# Patient Record
Sex: FEMALE | Race: WHITE | NOT HISPANIC OR LATINO | Employment: FULL TIME | URBAN - METROPOLITAN AREA
[De-identification: names, ages, dates, MRNs, and addresses within clinical notes are randomized per-mention and may not be internally consistent; named-entity substitution may affect disease eponyms.]

---

## 2017-02-02 ENCOUNTER — OFFICE VISIT (OUTPATIENT)
Dept: OTOLARYNGOLOGY | Facility: CLINIC | Age: 52
End: 2017-02-02

## 2017-02-02 VITALS — WEIGHT: 221 LBS | HEIGHT: 66 IN | BODY MASS INDEX: 35.52 KG/M2

## 2017-02-02 DIAGNOSIS — H60.503 ACUTE OTITIS EXTERNA OF BOTH EARS, UNSPECIFIED TYPE: Primary | ICD-10-CM

## 2017-02-02 DIAGNOSIS — H92.09 EAR PAIN: Primary | ICD-10-CM

## 2017-02-02 LAB
GRAM STN SPEC: ABNORMAL
MICRO REPORT STATUS: ABNORMAL
SPECIMEN SOURCE: ABNORMAL

## 2017-02-02 RX ORDER — CLOTRIMAZOLE 1 %
CREAM (GRAM) TOPICAL
Qty: 2 G | Refills: 1 | Status: SHIPPED | OUTPATIENT
Start: 2017-02-02 | End: 2024-07-16

## 2017-02-02 RX ORDER — CLOTRIMAZOLE 1 G/ML
SOLUTION TOPICAL
Qty: 10 ML | Refills: 1 | Status: SHIPPED | OUTPATIENT
Start: 2017-02-02 | End: 2024-07-16

## 2017-02-02 RX ORDER — TOBRAMYCIN AND DEXAMETHASONE 3; 1 MG/ML; MG/ML
SUSPENSION/ DROPS OPHTHALMIC
Qty: 1 BOTTLE | Refills: 0 | Status: SHIPPED | OUTPATIENT
Start: 2017-02-02 | End: 2024-07-16

## 2017-02-02 ASSESSMENT — PAIN SCALES - GENERAL: PAINLEVEL: NO PAIN (0)

## 2017-02-02 NOTE — MR AVS SNAPSHOT
After Visit Summary   2/2/2017    Veronica Turk    MRN: [id number]           Patient Information     Date Of Birth          1965        Visit Information        Provider Department      2/2/2017 1:00 PM Catrachito Woodruff MD [facility]        Today's Diagnoses     Acute otitis externa of both ears, unspecified type    -  1       Care Instructions    The patient presents with a history of bilateral otitis externa. Cultures are collected and she will be initiated on Tobradex Drops, Lotrimin Cream and Lotrimin Drops. The patient will be seen again in one week and adjustments to therapy will be made as appropriate.         Follow-ups after your visit        Your next 10 appointments already scheduled     Feb 02, 2017  1:00 PM   (Arrive by 12:45 PM)   Return Visit with Catrachito Woodruff MD   [facility] ([facility])    [address] [place] [address] [phone]            Feb 10, 2017  8:30 AM   (Arrive by 8:15 AM)   Return Visit with Catrachito Woodruff MD   [facility])    [address] [place] [address] [phone]              Who to contact     Please call your clinic at [phone] to:    Ask questions about your health    Make or cancel appointments    Discuss your medicines    Learn about your test results    Speak to your doctor   If you have compliments or concerns about an experience at your clinic, or if you wish to file a complaint, please contact [facility] Patient Relations at [phone] or email us at [email or web address]         Additional Information About Your Visit        ReviewZAPhart Information     SUPENTA is an electronic gateway that provides easy, online access to your medical records. With SUPENTA, you can request a clinic appointment, read  "your test results, renew a prescription or communicate with your care team.     To sign up for Geswindhart visit the website at [email or web address]   You will be asked to enter the access code listed below, as well as some personal information. Please follow the directions to create your username and password.     Your access code is: [email or web address]  Expires: 5/3/2017 11:25 AM     Your access code will  in 90 days. If you need help or a new code, please contact your [facility] or call [phone] for assistance.        Care EveryWhere ID     This is your Care EveryWhere ID. This could be used by other organizations to access your [place] medical records  [id number]        Your Vitals Were     Height BMI (Body Mass Index)                1.68 m (5' 6.14\") 35.52 kg/m2           Blood Pressure from Last 3 Encounters:   11 112/71    Weight from Last 3 Encounters:   17 100.245 kg (221 lb)   16 98.567 kg (217 lb 4.8 oz)   11 83.8 kg (184 lb 11.9 oz)              We Performed the Following     Ear Culture Aerobic Bacterial     Gram stain          Today's Medication Changes          These changes are accurate as of: 17 11:32 AM.  If you have any questions, ask your nurse or doctor.               Start taking these medicines.        Dose/Directions    * clotrimazole 1 % solution   Commonly known as:  LOTRIMIN   Used for:  Acute otitis externa of both ears, unspecified type   Started by:  Catrachito Woodruff MD        Apply 2 drops to each ear canal BID X 1 month supply   Quantity:  10 mL   Refills:  1       * clotrimazole 1 % cream   Commonly known as:  LOTRIMIN   Used for:  Acute otitis externa of both ears, unspecified type   Started by:  Catrachito Woodruff MD        Apply to affected area BID X 1 month supply   Quantity:  2 g   Refills:  1       * Notice:  This list has 2 medication(s) that are the same as other medications prescribed for " you. Read the directions carefully, and ask your doctor or other care provider to review them with you.      These medicines have changed or have updated prescriptions.        Dose/Directions    tobramycin-dexamethasone 0.3-0.1 % ophthalmic susp   Commonly known as:  TOBRADEX   This may have changed:  additional instructions   Used for:  Acute otitis externa of both ears, unspecified type   Changed by:  Catrachito Woodruff MD        Two drops to each ear BID X 14 days supply   Quantity:  1 Bottle   Refills:  0         Stop taking these medicines if you haven't already. Please contact your care team if you have questions.     ciprofloxacin-dexamethasone otic suspension   Commonly known as:  CIPRODEX   Stopped by:  Catrachito Woodruff MD           neomycin-polymyxin-hydrocortisone otic solution   Commonly known as:  CORTISPORIN   Stopped by:  Catrachito Woodruff MD                Where to get your medicines      These medications were sent to [id number] - [place], [place] - [address] [facility]  [address] [facility] [address]     Phone:  [phone]    - clotrimazole 1 % cream  - clotrimazole 1 % solution  - tobramycin-dexamethasone 0.3-0.1 % ophthalmic susp             Primary Care Provider Office Phone # Fax #    Chani Ghotra [phone]       [facility] [place] [id number] [address] [address]        Thank you!     Thank you for choosing [facility]  for your care. Our goal is always to provide you with excellent care. Hearing back from our patients is one way we can continue to improve our services. Please take a few minutes to complete the written survey that you may receive in the mail after your visit with us. Thank you!             Your Updated Medication List - Protect others around you: Learn how to safely use, store and throw away your medicines at www.disposemymeds.org.          This list is accurate as of:  2/2/17 11:32 AM.  Always use your most recent med list.                   Brand Name Dispense Instructions for use    * clotrimazole 1 % solution    LOTRIMIN    10 mL    Apply 2 drops to each ear canal BID X 1 month supply       * clotrimazole 1 % cream    LOTRIMIN    2 g    Apply to affected area BID X 1 month supply       MULTIVITAL PO          mupirocin 2 % ointment    BACTROBAN    1 g    Apply a small amount to both ear 2 times a day for 10 days       OMEGA-3 FISH OIL PO          tobramycin-dexamethasone 0.3-0.1 % ophthalmic susp    TOBRADEX    1 Bottle    Two drops to each ear BID X 14 days supply       * triamcinolone 0.025 % cream    KENALOG    5 g    Please use thin layer bilateral ear canals opening at night to begin after 3 weeks of Triamcinolone at strength of 0.1%       * triamcinolone 0.1 % cream    KENALOG    1 g    Use thin layer to bilatereal external auditory canal opening at night x 3 weeks, then stop, use 0.025 % strength as maintenance       * triamcinolone 0.025 % cream    KENALOG    80 g    Apply topically At Bedtime Apply to affected ear after completion of bactroban treatment.       ZOLOFT PO      Take 40 mg by mouth daily       * Notice:  This list has 5 medication(s) that are the same as other medications prescribed for you. Read the directions carefully, and ask your doctor or other care provider to review them with you.

## 2017-02-02 NOTE — NURSING NOTE
Chief Complaint   Patient presents with     RECHECK     possible ear infection. Needs ear cleaning first then audio     Jose Calderon LPN

## 2017-02-02 NOTE — Clinical Note
2/2/2017       RE: Veronica Turk  [address] [facility] [address]     Dear Colleague,    Thank you for referring your patient, Veronica Turk, to the [facility] at [facility]. Please see a copy of my visit note below.    The patient presents with a history of chronic otitis media and mastoiditis. She has previously undergone successful surgery with Dr. Moon Richter on her right ear. She recently developed an acute infection in the ear canals. The patient reports that her ear are plugged and her hearing is diminished in both ears. She has been evaluated for allergies and no specific cause of the chapping, crusting and itching on the conchal bowls of the ears was identified.        All other systems were reviewed and they are either negative or they are not directly pertinent to this Otolaryngology examination.      Past Medical History:    Past Medical History   Diagnosis Date     Tinnitus      Nasal congestion      Sore throat      Snoring      Eczema      Hearing problem        Past Surgical History:    Past Surgical History   Procedure Laterality Date     Breast surgery       reduction     Orthopedic surgery       knee acl scope     Tympanoplasty  8/17/2011     Procedure:TYMPANOPLASTY; Posterior Auricular with facial nerve monitoring *Latex Safe* tympanoplasty with canalplasty; Surgeon:MOON RICHTER; Location:[facility] OR       Medications:      Current outpatient prescriptions:      triamcinolone (KENALOG) 0.025 % cream, Apply topically At Bedtime Apply to affected ear after completion of bactroban treatment., Disp: 80 g, Rfl: 5     Omega-3 Fatty Acids (OMEGA-3 FISH OIL PO), , Disp: , Rfl:      Sertraline HCl (ZOLOFT PO), Take 40 mg by mouth daily, Disp: , Rfl:      Multiple Vitamins-Minerals (MULTIVITAL PO), , Disp: , Rfl:      mupirocin (BACTROBAN) 2 % ointment, Apply a small amount to both ear 2 times a day for 10 days, Disp: 1  g, Rfl: 1     triamcinolone (KENALOG) 0.025 % cream, Please use thin layer bilateral ear canals opening at night to begin after 3 weeks of Triamcinolone at strength of 0.1%, Disp: 5 g, Rfl: 2     triamcinolone (KENALOG) 0.1 % cream, Use thin layer to bilatereal external auditory canal opening at night x 3 weeks, then stop, use 0.025 % strength as maintenance, Disp: 1 g, Rfl: 1    Allergies:    Ciprofloxacin and Ciprofloxacin    Physical Examination:    The patient is a well developed, well nourished female in no apparent distress.  She is normocephalic, atraumatic with pupils equally round and reactive to light.    Oral Cavity Examination:  Normal mucosa with no masses or lesions  Nasal Examination: Normal mucosa with no masses or lesions  Ear Examination: Ear canals are impacted with infected debris and cerumen and the skin of the conchal bowls of the ears bilaterally are crusted and scabbed. A culture is collected from the right ear canal. Tympanic membranes are intact and middle ear spaces free of infections or effusions.   Neurological Examination: Facial nerve function intact and symmetric  Integumentary Examination: No lesions on the skin of the head and neck    Assessment and Plan:    The patient presents with a history of bilateral otitis externa. Cultures are collected and she will be initiated on Tobradex Drops, Lotrimin Cream and Lotrimin Drops. The patient will be seen again in one week and adjustments to therapy will be made as appropriate.     CC: Dr. Moon Richter      Again, thank you for allowing me to participate in the care of your patient.      Sincerely,    Catrachito Woodruff MD

## 2017-02-02 NOTE — PATIENT INSTRUCTIONS
The patient presents with a history of bilateral otitis externa. Cultures are collected and she will be initiated on Tobradex Drops, Lotrimin Cream and Lotrimin Drops. The patient will be seen again in one week and adjustments to therapy will be made as appropriate.

## 2017-02-02 NOTE — PROGRESS NOTES
The patient presents with a history of chronic otitis media and mastoiditis. She has previously undergone successful surgery with Dr. Moon Richter on her right ear. She recently developed an acute infection in the ear canals. The patient reports that her ear are plugged and her hearing is diminished in both ears. She has been evaluated for allergies and no specific cause of the chapping, crusting and itching on the conchal bowls of the ears was identified.        All other systems were reviewed and they are either negative or they are not directly pertinent to this Otolaryngology examination.      Past Medical History:    Past Medical History   Diagnosis Date     Tinnitus      Nasal congestion      Sore throat      Snoring      Eczema      Hearing problem        Past Surgical History:    Past Surgical History   Procedure Laterality Date     Breast surgery       reduction     Orthopedic surgery       knee acl scope     Tympanoplasty  8/17/2011     Procedure:TYMPANOPLASTY; Posterior Auricular with facial nerve monitoring *Latex Safe* tympanoplasty with canalplasty; Surgeon:MOON RICHTER; Location: OR       Medications:      Current outpatient prescriptions:      triamcinolone (KENALOG) 0.025 % cream, Apply topically At Bedtime Apply to affected ear after completion of bactroban treatment., Disp: 80 g, Rfl: 5     Omega-3 Fatty Acids (OMEGA-3 FISH OIL PO), , Disp: , Rfl:      Sertraline HCl (ZOLOFT PO), Take 40 mg by mouth daily, Disp: , Rfl:      Multiple Vitamins-Minerals (MULTIVITAL PO), , Disp: , Rfl:      mupirocin (BACTROBAN) 2 % ointment, Apply a small amount to both ear 2 times a day for 10 days, Disp: 1 g, Rfl: 1     triamcinolone (KENALOG) 0.025 % cream, Please use thin layer bilateral ear canals opening at night to begin after 3 weeks of Triamcinolone at strength of 0.1%, Disp: 5 g, Rfl: 2     triamcinolone (KENALOG) 0.1 % cream, Use thin layer to bilatereal external auditory canal opening at night x  3 weeks, then stop, use 0.025 % strength as maintenance, Disp: 1 g, Rfl: 1    Allergies:    Ciprofloxacin and Ciprofloxacin    Physical Examination:    The patient is a well developed, well nourished female in no apparent distress.  She is normocephalic, atraumatic with pupils equally round and reactive to light.    Oral Cavity Examination:  Normal mucosa with no masses or lesions  Nasal Examination: Normal mucosa with no masses or lesions  Ear Examination: Ear canals are impacted with infected debris and cerumen and the skin of the conchal bowls of the ears bilaterally are crusted and scabbed. A culture is collected from the right ear canal. Tympanic membranes are intact and middle ear spaces free of infections or effusions.   Neurological Examination: Facial nerve function intact and symmetric  Integumentary Examination: No lesions on the skin of the head and neck    Assessment and Plan:    The patient presents with a history of bilateral otitis externa. Cultures are collected and she will be initiated on Tobradex Drops, Lotrimin Cream and Lotrimin Drops. The patient will be seen again in one week and adjustments to therapy will be made as appropriate.     CC: Dr. Moon Richter

## 2017-02-05 LAB
BACTERIA SPEC CULT: ABNORMAL
MICRO REPORT STATUS: ABNORMAL
MICROORGANISM SPEC CULT: ABNORMAL
SPECIMEN SOURCE: ABNORMAL

## 2017-02-06 RX ORDER — SULFAMETHOXAZOLE/TRIMETHOPRIM 800-160 MG
1 TABLET ORAL 2 TIMES DAILY
Qty: 20 TABLET | Refills: 0 | Status: SHIPPED | OUTPATIENT
Start: 2017-02-06 | End: 2017-02-16

## 2017-02-06 NOTE — PROGRESS NOTES
Based upon the culture and sensitivity report, the patient will be treated with bactrim in addition to tobradex otic drops and she will be seen again in one.

## 2017-02-10 ENCOUNTER — OFFICE VISIT (OUTPATIENT)
Dept: OTOLARYNGOLOGY | Facility: CLINIC | Age: 52
End: 2017-02-10

## 2017-02-10 DIAGNOSIS — H60.503 ACUTE OTITIS EXTERNA OF BOTH EARS, UNSPECIFIED TYPE: ICD-10-CM

## 2017-02-10 DIAGNOSIS — H60.63 CHRONIC OTITIS EXTERNA OF BOTH EARS, UNSPECIFIED TYPE: Primary | ICD-10-CM

## 2017-02-10 ASSESSMENT — PAIN SCALES - GENERAL: PAINLEVEL: NO PAIN (0)

## 2017-02-10 NOTE — MR AVS SNAPSHOT
After Visit Summary   2/10/2017    Veronica Turk    MRN: [id number]           Patient Information     Date Of Birth          1965        Visit Information        Provider Department      2/10/2017 8:30 AM Catrachito Woodruff MD [facility]        Today's Diagnoses     Chronic otitis externa of both ears, unspecified type    -  1     Acute otitis externa of both ears, unspecified type           Care Instructions    The patient presents with a history of bilateral otitis externa. Her therapy was adjusted based upon her culture results and she has responded very well to Bactrim, Tobradex Drops, Lotrimin Cream and Lotrimin Drops. The patient will be seen again in two weeks and a new culture will be obtained at that time to verify complete resolution of her infection.        Follow-ups after your visit        Your next 10 appointments already scheduled     Feb 24, 2017  8:45 AM   (Arrive by 8:30 AM)   Return Visit with MD FREDY [facility] ([facility])    [address] [place] [address] [phone]              Who to contact     Please call your clinic at [phone] to:    Ask questions about your health    Make or cancel appointments    Discuss your medicines    Learn about your test results    Speak to your doctor   If you have compliments or concerns about an experience at your clinic, or if you wish to file a complaint, please contact [facility] Patient Relations at [phone] or email us at [email or web address]         Additional Information About Your Visit        MyChart Information     Blippar is an electronic gateway that provides easy, online access to your medical records. With Blippar, you can request a clinic appointment, read your test results, renew a prescription or communicate with your care team.     To sign up for  Aneglt visit the website at [email or web address]   You will be asked to enter the access code listed below, as well as some personal information. Please follow the directions to create your username and password.     Your access code is: [email or web address]  Expires: 5/3/2017 11:25 AM     Your access code will  in 90 days. If you need help or a new code, please contact your [facility] or call [phone] for assistance.        Care EveryWhere ID     This is your Care EveryWhere ID. This could be used by other organizations to access your [place] medical records  [id number]         Blood Pressure from Last 3 Encounters:   11 112/71    Weight from Last 3 Encounters:   17 100.245 kg (221 lb)   16 98.567 kg (217 lb 4.8 oz)   11 83.8 kg (184 lb 11.9 oz)              Today, you had the following     No orders found for display       Primary Care Provider Office Phone # Fax #    Chani Ghotra [phone]       [facility] [address] [address]        Thank you!     Thank you for choosing [facility] TH[facility]  for your care. Our goal is always to provide you with excellent care. Hearing back from our patients is one way we can continue to improve our services. Please take a few minutes to complete the written survey that you may receive in the mail after your visit with us. Thank you!             Your Updated Medication List - Protect others around you: Learn how to safely use, store and throw away your medicines at www.disposemymeds.org.          This list is accurate as of: 2/10/17  8:30 AM.  Always use your most recent med list.                   Brand Name Dispense Instructions for use    * clotrimazole 1 % solution    LOTRIMIN    10 mL    Apply 2 drops to each ear canal BID X 1 month supply       * clotrimazole 1 % cream    LOTRIMIN    2 g    Apply to affected area BID X 1 month supply        MULTIVITAL PO          mupirocin 2 % ointment    BACTROBAN    1 g    Apply a small amount to both ear 2 times a day for 10 days       OMEGA-3 FISH OIL PO          sulfamethoxazole-trimethoprim 800-160 MG per tablet    BACTRIM DS    20 tablet    Take 1 tablet by mouth 2 times daily for 10 days       tobramycin-dexamethasone 0.3-0.1 % ophthalmic susp    TOBRADEX    1 Bottle    Two drops to each ear BID X 14 days supply       * triamcinolone 0.025 % cream    KENALOG    5 g    Please use thin layer bilateral ear canals opening at night to begin after 3 weeks of Triamcinolone at strength of 0.1%       * triamcinolone 0.1 % cream    KENALOG    1 g    Use thin layer to bilatereal external auditory canal opening at night x 3 weeks, then stop, use 0.025 % strength as maintenance       * triamcinolone 0.025 % cream    KENALOG    80 g    Apply topically At Bedtime Apply to affected ear after completion of bactroban treatment.       ZOLOFT PO      Take 40 mg by mouth daily       * Notice:  This list has 5 medication(s) that are the same as other medications prescribed for you. Read the directions carefully, and ask your doctor or other care provider to review them with you.

## 2017-02-10 NOTE — NURSING NOTE
Chief Complaint   Patient presents with     RECHECK     acute otitis     Janice Armenta Medical Assistant

## 2017-02-10 NOTE — PATIENT INSTRUCTIONS
The patient presents with a history of bilateral otitis externa. Her therapy was adjusted based upon her culture results and she has responded very well to Bactrim, Tobradex Drops, Lotrimin Cream and Lotrimin Drops. The patient will be seen again in two weeks and a new culture will be obtained at that time to verify complete resolution of her infection.

## 2017-02-10 NOTE — PROGRESS NOTES
The patient presents with a history of chronic otitis media and mastoiditis. She has previously undergone successful surgery with Dr. Moon Richter on her right ear. She recently developed an acute infection in the ear canals. The patient's therapy was adjusted based upon her culture results and she has responded very well to medical therapy. She reports that the irritation on the external aspect of the ears and in the ear canals has resolved and her hearing is improved.       All other systems were reviewed and they are either negative or they are not directly pertinent to this Otolaryngology examination.      Past Medical History:    Past Medical History   Diagnosis Date     Tinnitus      Nasal congestion      Sore throat      Snoring      Eczema      Hearing problem        Past Surgical History:    Past Surgical History   Procedure Laterality Date     Breast surgery       reduction     Orthopedic surgery       knee acl scope     Tympanoplasty  8/17/2011     Procedure:TYMPANOPLASTY; Posterior Auricular with facial nerve monitoring *Latex Safe* tympanoplasty with canalplasty; Surgeon:MOON RICHTER; Location: OR       Medications:      Current outpatient prescriptions:      sulfamethoxazole-trimethoprim (BACTRIM DS) 800-160 MG per tablet, Take 1 tablet by mouth 2 times daily for 10 days, Disp: 20 tablet, Rfl: 0     clotrimazole (LOTRIMIN) 1 % solution, Apply 2 drops to each ear canal BID X 1 month supply, Disp: 10 mL, Rfl: 1     clotrimazole (LOTRIMIN) 1 % cream, Apply to affected area BID X 1 month supply, Disp: 2 g, Rfl: 1     tobramycin-dexamethasone (TOBRADEX) 0.3-0.1 % ophthalmic susp, Two drops to each ear BID X 14 days supply, Disp: 1 Bottle, Rfl: 0     triamcinolone (KENALOG) 0.025 % cream, Apply topically At Bedtime Apply to affected ear after completion of bactroban treatment., Disp: 80 g, Rfl: 5     Omega-3 Fatty Acids (OMEGA-3 FISH OIL PO), , Disp: , Rfl:      Sertraline HCl (ZOLOFT PO), Take 40  mg by mouth daily, Disp: , Rfl:      Multiple Vitamins-Minerals (MULTIVITAL PO), , Disp: , Rfl:      mupirocin (BACTROBAN) 2 % ointment, Apply a small amount to both ear 2 times a day for 10 days, Disp: 1 g, Rfl: 1     triamcinolone (KENALOG) 0.025 % cream, Please use thin layer bilateral ear canals opening at night to begin after 3 weeks of Triamcinolone at strength of 0.1%, Disp: 5 g, Rfl: 2     triamcinolone (KENALOG) 0.1 % cream, Use thin layer to bilatereal external auditory canal opening at night x 3 weeks, then stop, use 0.025 % strength as maintenance, Disp: 1 g, Rfl: 1    Allergies:    Ciprofloxacin and Ciprofloxacin    Physical Examination:    The patient is a well developed, well nourished female in no apparent distress.  She is normocephalic, atraumatic with pupils equally round and reactive to light.    Oral Cavity Examination:  Normal mucosa with no masses or lesions  Nasal Examination: Normal mucosa with no masses or lesions  Ear Examination: Ear canals are clear and the skin of the conchal bowls of the ears bilaterally are no longer crusted and scabbed. Tympanic membranes are intact and middle ear spaces free of infections or effusions.   Neurological Examination: Facial nerve function intact and symmetric  Integumentary Examination: No lesions on the skin of the head and neck    Assessment and Plan:    The patient presents with a history of bilateral otitis externa. Her therapy was adjusted based upon her culture results and she has responded very well to Bactrim, Tobradex Drops, Lotrimin Cream and Lotrimin Drops. The patient will be seen again in two weeks and a new culture will be obtained at that time to verify complete resolution of her infection.    CC: Dr. Moon Richter

## 2017-02-10 NOTE — LETTER
2/10/2017       RE: Veronica Turk  [address] [facility] [address]     Dear Colleague,    Thank you for referring your patient, Veronica Turk, to the [facility] EAR NOSE AND THROAT at [facility]. Please see a copy of my visit note below.    The patient presents with a history of chronic otitis media and mastoiditis. She has previously undergone successful surgery with Dr. Moon Richter on her right ear. She recently developed an acute infection in the ear canals. The patient's therapy was adjusted based upon her culture results and she has responded very well to medical therapy. She reports that the irritation on the external aspect of the ears and in the ear canals has resolved and her hearing is improved.       All other systems were reviewed and they are either negative or they are not directly pertinent to this Otolaryngology examination.      Past Medical History:    Past Medical History   Diagnosis Date     Tinnitus      Nasal congestion      Sore throat      Snoring      Eczema      Hearing problem        Past Surgical History:    Past Surgical History   Procedure Laterality Date     Breast surgery       reduction     Orthopedic surgery       knee acl scope     Tympanoplasty  8/17/2011     Procedure:TYMPANOPLASTY; Posterior Auricular with facial nerve monitoring *Latex Safe* tympanoplasty with canalplasty; Surgeon:MOON RICHTER; Location:[facility] OR       Medications:      Current outpatient prescriptions:      sulfamethoxazole-trimethoprim (BACTRIM DS) 800-160 MG per tablet, Take 1 tablet by mouth 2 times daily for 10 days, Disp: 20 tablet, Rfl: 0     clotrimazole (LOTRIMIN) 1 % solution, Apply 2 drops to each ear canal BID X 1 month supply, Disp: 10 mL, Rfl: 1     clotrimazole (LOTRIMIN) 1 % cream, Apply to affected area BID X 1 month supply, Disp: 2 g, Rfl: 1     tobramycin-dexamethasone (TOBRADEX) 0.3-0.1 % ophthalmic susp, Two drops to each  ear BID X 14 days supply, Disp: 1 Bottle, Rfl: 0     triamcinolone (KENALOG) 0.025 % cream, Apply topically At Bedtime Apply to affected ear after completion of bactroban treatment., Disp: 80 g, Rfl: 5     Omega-3 Fatty Acids (OMEGA-3 FISH OIL PO), , Disp: , Rfl:      Sertraline HCl (ZOLOFT PO), Take 40 mg by mouth daily, Disp: , Rfl:      Multiple Vitamins-Minerals (MULTIVITAL PO), , Disp: , Rfl:      mupirocin (BACTROBAN) 2 % ointment, Apply a small amount to both ear 2 times a day for 10 days, Disp: 1 g, Rfl: 1     triamcinolone (KENALOG) 0.025 % cream, Please use thin layer bilateral ear canals opening at night to begin after 3 weeks of Triamcinolone at strength of 0.1%, Disp: 5 g, Rfl: 2     triamcinolone (KENALOG) 0.1 % cream, Use thin layer to bilatereal external auditory canal opening at night x 3 weeks, then stop, use 0.025 % strength as maintenance, Disp: 1 g, Rfl: 1    Allergies:    Ciprofloxacin and Ciprofloxacin    Physical Examination:    The patient is a well developed, well nourished female in no apparent distress.  She is normocephalic, atraumatic with pupils equally round and reactive to light.    Oral Cavity Examination:  Normal mucosa with no masses or lesions  Nasal Examination: Normal mucosa with no masses or lesions  Ear Examination: Ear canals are clear and the skin of the conchal bowls of the ears bilaterally are no longer crusted and scabbed. Tympanic membranes are intact and middle ear spaces free of infections or effusions.   Neurological Examination: Facial nerve function intact and symmetric  Integumentary Examination: No lesions on the skin of the head and neck    Assessment and Plan:    The patient presents with a history of bilateral otitis externa. Her therapy was adjusted based upon her culture results and she has responded very well to Bactrim, Tobradex Drops, Lotrimin Cream and Lotrimin Drops. The patient will be seen again in two weeks and a new culture will be obtained at that  time to verify complete resolution of her infection.    CC: Dr. Moon Richter        Again, thank you for allowing me to participate in the care of your patient.      Sincerely,    Catrachito Woodruff MD

## 2017-02-24 ENCOUNTER — OFFICE VISIT (OUTPATIENT)
Dept: OTOLARYNGOLOGY | Facility: CLINIC | Age: 52
End: 2017-02-24

## 2017-02-24 VITALS — BODY MASS INDEX: 36 KG/M2 | WEIGHT: 224 LBS | HEIGHT: 66 IN

## 2017-02-24 DIAGNOSIS — L30.9 ECZEMA, UNSPECIFIED TYPE: Primary | ICD-10-CM

## 2017-02-24 DIAGNOSIS — H60.63 CHRONIC OTITIS EXTERNA OF BOTH EARS, UNSPECIFIED TYPE: ICD-10-CM

## 2017-02-24 ASSESSMENT — PAIN SCALES - GENERAL: PAINLEVEL: NO PAIN (0)

## 2017-02-24 NOTE — MR AVS SNAPSHOT
After Visit Summary   2/24/2017    Veronica Turk    MRN: [id number]           Patient Information     Date Of Birth          1965        Visit Information        Provider Department      2/24/2017 8:45 AM Catrachito Woodruff MD [facility] Ear Nose and Throat        Today's Diagnoses     Eczema, unspecified type    -  1    Chronic otitis externa of both ears, unspecified type          Care Instructions    The patient presents with a history of bilateral otitis externa. Her therapy was adjusted based upon her culture results and she has responded very well to Bactrim, Tobradex Drops, Lotrimin Cream and Lotrimin Drops. The patient will be referred to Dermatology to assess the cause of the skin irritation on the ears that is leading these ear canal infections. She will complete the use of her medications in the interim. Cultures are obtained from both ears and these will be used to determine whether all infection of the ear canals has been effectively treated.         Follow-ups after your visit        Additional Services     DERMATOLOGY REFERRAL       Chronic eczema on the ears                  Your next 10 appointments already scheduled     Mar 28, 2017  8:45 AM CDT   (Arrive by 8:30 AM)   New Patient Visit with Hermes Lundy MD   [facility] Dermatology ([facility])    [address] [place] [id number] [phone]            Apr 06, 2017 10:00 AM CDT   (Arrive by 9:45 AM)   Return Visit with Catrachito Woodruff MD   [facility] Ear Nose [facility] Throat ([facility])    [address] [place] [id number] [phone]              Who to contact     Please call your clinic at [phone] to:    Ask questions about your health    Make or cancel appointments    Discuss your medicines    Learn about your test results    Speak to your doctor   If you have compliments or concerns about an  "experience at your clinic, or if you wish to file a complaint, please contact [facility] Patient Relations at [phone] or email us at [email or web address]         Additional Information About Your Visit        [employer] Information     [employer] is an electronic gateway that provides easy, online access to your medical records. With [employer], you can request a clinic appointment, read your test results, renew a prescription or communicate with your care team.     To sign up for [employer] visit the website at [email or web address]   You will be asked to enter the access code listed below, as well as some personal information. Please follow the directions to create your username and password.     Your access code is: [email or web address]  Expires: 5/3/2017 11:25 AM     Your access code will  in 90 days. If you need help or a new code, please contact your [facility] Clinic or call [phone] for assistance.        Care EveryWhere ID     This is your Care EveryWhere ID. This could be used by other organizations to access your [place] medical records  [id number]        Your Vitals Were     Height BMI (Body Mass Index)                1.676 m (5' 6\") 36.15 kg/m2           Blood Pressure from Last 3 Encounters:   11 112/71    Weight from Last 3 Encounters:   17 101.6 kg (224 lb)   17 100.2 kg (221 lb)   16 98.6 kg (217 lb 4.8 oz)              We Performed the Following     DERMATOLOGY REFERRAL     Ear Culture Aerobic Bacterial     Fungus Culture, non-blood        Primary Care Provider Office Phone # Fax #    Chani SUAREZ [place] [phone]       [facility] [address] [address]        Thank you!     Thank you for choosing [facility] EAR NOSE AND THROAT  for your care. Our goal is always to provide you with excellent care. Hearing back from our patients is one way we can continue to improve " our services. Please take a few minutes to complete the written survey that you may receive in the mail after your visit with us. Thank you!             Your Updated Medication List - Protect others around you: Learn how to safely use, store and throw away your medicines at www.disposemymeds.org.          This list is accurate as of: 2/24/17  9:33 AM.  Always use your most recent med list.                   Brand Name Dispense Instructions for use    * clotrimazole 1 % solution    LOTRIMIN    10 mL    Apply 2 drops to each ear canal BID X 1 month supply       * clotrimazole 1 % cream    LOTRIMIN    2 g    Apply to affected area BID X 1 month supply       MULTIVITAL PO          mupirocin 2 % ointment    BACTROBAN    1 g    Apply a small amount to both ear 2 times a day for 10 days       OMEGA-3 FISH OIL PO          tobramycin-dexamethasone 0.3-0.1 % ophthalmic susp    TOBRADEX    1 Bottle    Two drops to each ear BID X 14 days supply       * triamcinolone 0.025 % cream    KENALOG    5 g    Please use thin layer bilateral ear canals opening at night to begin after 3 weeks of Triamcinolone at strength of 0.1%       * triamcinolone 0.1 % cream    KENALOG    1 g    Use thin layer to bilatereal external auditory canal opening at night x 3 weeks, then stop, use 0.025 % strength as maintenance       * triamcinolone 0.025 % cream    KENALOG    80 g    Apply topically At Bedtime Apply to affected ear after completion of bactroban treatment.       ZOLOFT PO      Take 40 mg by mouth daily       * Notice:  This list has 5 medication(s) that are the same as other medications prescribed for you. Read the directions carefully, and ask your doctor or other care provider to review them with you.

## 2017-02-24 NOTE — PATIENT INSTRUCTIONS
The patient presents with a history of bilateral otitis externa. Her therapy was adjusted based upon her culture results and she has responded very well to Bactrim, Tobradex Drops, Lotrimin Cream and Lotrimin Drops. The patient will be referred to Dermatology to assess the cause of the skin irritation on the ears that is leading these ear canal infections. She will complete the use of her medications in the interim. Cultures are obtained from both ears and these will be used to determine whether all infection of the ear canals has been effectively treated.

## 2017-02-24 NOTE — LETTER
2/24/2017       RE: Veronica Turk  [address] [facility] [address]     Dear Colleague,    Thank you for referring your patient, Veronica Turk, to the [facility] EAR NOSE AND THROAT at [facility]. Please see a copy of my visit note below.    The patient presents with a history of chronic otitis media and mastoiditis. She has previously undergone successful surgery with Dr. Moon Richter on her right ear. She recently developed an acute infection in the ear canals. The patient's therapy was adjusted based upon her culture results and she has responded very well to medical therapy. She reports that the irritation on the external aspect of the ears and in the ear canals has resolved and her hearing is improved. However, she continues to have skin irritation on the bowls of the ears.        All other systems were reviewed and they are either negative or they are not directly pertinent to this Otolaryngology examination.      Past Medical History:    Past Medical History   Diagnosis Date     Eczema      Hearing problem      Nasal congestion      Snoring      Sore throat      Tinnitus        Past Surgical History:    Past Surgical History   Procedure Laterality Date     Breast surgery       reduction     Orthopedic surgery       knee acl scope     Tympanoplasty  8/17/2011     Procedure:TYMPANOPLASTY; Posterior Auricular with facial nerve monitoring *Latex Safe* tympanoplasty with canalplasty; Surgeon:MOON RICHTER; Location: OR       Medications:      Current Outpatient Prescriptions:      clotrimazole (LOTRIMIN) 1 % solution, Apply 2 drops to each ear canal BID X 1 month supply, Disp: 10 mL, Rfl: 1     clotrimazole (LOTRIMIN) 1 % cream, Apply to affected area BID X 1 month supply, Disp: 2 g, Rfl: 1     tobramycin-dexamethasone (TOBRADEX) 0.3-0.1 % ophthalmic susp, Two drops to each ear BID X 14 days supply, Disp: 1 Bottle, Rfl: 0     triamcinolone  (KENALOG) 0.025 % cream, Apply topically At Bedtime Apply to affected ear after completion of bactroban treatment., Disp: 80 g, Rfl: 5     Omega-3 Fatty Acids (OMEGA-3 FISH OIL PO), , Disp: , Rfl:      Sertraline HCl (ZOLOFT PO), Take 40 mg by mouth daily, Disp: , Rfl:      Multiple Vitamins-Minerals (MULTIVITAL PO), , Disp: , Rfl:      mupirocin (BACTROBAN) 2 % ointment, Apply a small amount to both ear 2 times a day for 10 days, Disp: 1 g, Rfl: 1     triamcinolone (KENALOG) 0.025 % cream, Please use thin layer bilateral ear canals opening at night to begin after 3 weeks of Triamcinolone at strength of 0.1%, Disp: 5 g, Rfl: 2     triamcinolone (KENALOG) 0.1 % cream, Use thin layer to bilatereal external auditory canal opening at night x 3 weeks, then stop, use 0.025 % strength as maintenance, Disp: 1 g, Rfl: 1    Allergies:    Ciprofloxacin and Ciprofloxacin    Physical Examination:    The patient is a well developed, well nourished female in no apparent distress.  She is normocephalic, atraumatic with pupils equally round and reactive to light.    Oral Cavity Examination:  Normal mucosa with no masses or lesions  Nasal Examination: Normal mucosa with no masses or lesions  Ear Examination: Ear canals are clear and the skin of the conchal bowls of the ears bilaterally are no longer crusted and scabbed. Tympanic membranes are intact and middle ear spaces free of infections or effusions. Cultures are collected from both ear canals.  Neurological Examination: Facial nerve function intact and symmetric  Integumentary Examination: No lesions on the skin of the head and neck    Assessment and Plan:    The patient presents with a history of bilateral otitis externa. Her therapy was adjusted based upon her culture results and she has responded very well to Bactrim, Tobradex Drops, Lotrimin Cream and Lotrimin Drops. The patient will be referred to Dermatology to assess the cause of the skin irritation on the ears that is  leading these ear canal infections. She will complete the use of her medications in the interim. Cultures are obtained from both ears and these will be used to determine whether all infection of the ear canals has been effectively treated.     CC: Dr. Moon Richter      Again, thank you for allowing me to participate in the care of your patient.      Sincerely,    Catrachito Woodruff MD

## 2017-02-24 NOTE — PROGRESS NOTES
The patient presents with a history of chronic otitis media and mastoiditis. She has previously undergone successful surgery with Dr. Moon Richter on her right ear. She recently developed an acute infection in the ear canals. The patient's therapy was adjusted based upon her culture results and she has responded very well to medical therapy. She reports that the irritation on the external aspect of the ears and in the ear canals has resolved and her hearing is improved. However, she continues to have skin irritation on the bowls of the ears.        All other systems were reviewed and they are either negative or they are not directly pertinent to this Otolaryngology examination.      Past Medical History:    Past Medical History   Diagnosis Date     Eczema      Hearing problem      Nasal congestion      Snoring      Sore throat      Tinnitus        Past Surgical History:    Past Surgical History   Procedure Laterality Date     Breast surgery       reduction     Orthopedic surgery       knee acl scope     Tympanoplasty  8/17/2011     Procedure:TYMPANOPLASTY; Posterior Auricular with facial nerve monitoring *Latex Safe* tympanoplasty with canalplasty; Surgeon:MOON RICHTER; Location[facility]       Medications:      Current Outpatient Prescriptions:      clotrimazole (LOTRIMIN) 1 % solution, Apply 2 drops to each ear canal BID X 1 month supply, Disp: 10 mL, Rfl: 1     clotrimazole (LOTRIMIN) 1 % cream, Apply to affected area BID X 1 month supply, Disp: 2 g, Rfl: 1     tobramycin-dexamethasone (TOBRADEX) 0.3-0.1 % ophthalmic susp, Two drops to each ear BID X 14 days supply, Disp: 1 Bottle, Rfl: 0     triamcinolone (KENALOG) 0.025 % cream, Apply topically At Bedtime Apply to affected ear after completion of bactroban treatment., Disp: 80 g, Rfl: 5     Omega-3 Fatty Acids (OMEGA-3 FISH OIL PO), , Disp: , Rfl:      Sertraline HCl (ZOLOFT PO), Take 40 mg by mouth daily, Disp: , Rfl:      Multiple Vitamins-Minerals  (MULTIVITAL PO), , Disp: , Rfl:      mupirocin (BACTROBAN) 2 % ointment, Apply a small amount to both ear 2 times a day for 10 days, Disp: 1 g, Rfl: 1     triamcinolone (KENALOG) 0.025 % cream, Please use thin layer bilateral ear canals opening at night to begin after 3 weeks of Triamcinolone at strength of 0.1%, Disp: 5 g, Rfl: 2     triamcinolone (KENALOG) 0.1 % cream, Use thin layer to bilatereal external auditory canal opening at night x 3 weeks, then stop, use 0.025 % strength as maintenance, Disp: 1 g, Rfl: 1    Allergies:    Ciprofloxacin and Ciprofloxacin    Physical Examination:    The patient is a well developed, well nourished female in no apparent distress.  She is normocephalic, atraumatic with pupils equally round and reactive to light.    Oral Cavity Examination:  Normal mucosa with no masses or lesions  Nasal Examination: Normal mucosa with no masses or lesions  Ear Examination: Ear canals are clear and the skin of the conchal bowls of the ears bilaterally are no longer crusted and scabbed. Tympanic membranes are intact and middle ear spaces free of infections or effusions. Cultures are collected from both ear canals.  Neurological Examination: Facial nerve function intact and symmetric  Integumentary Examination: No lesions on the skin of the head and neck    Assessment and Plan:    The patient presents with a history of bilateral otitis externa. Her therapy was adjusted based upon her culture results and she has responded very well to Bactrim, Tobradex Drops, Lotrimin Cream and Lotrimin Drops. The patient will be referred to Dermatology to assess the cause of the skin irritation on the ears that is leading these ear canal infections. She will complete the use of her medications in the interim. Cultures are obtained from both ears and these will be used to determine whether all infection of the ear canals has been effectively treated.     CC: Dr. Moon Richter

## 2017-02-27 ENCOUNTER — TELEPHONE (OUTPATIENT)
Dept: OTOLARYNGOLOGY | Facility: CLINIC | Age: 52
End: 2017-02-27

## 2017-02-27 DIAGNOSIS — A49.01 INFECTION DUE TO STAPHYLOCOCCUS AUREUS: Primary | ICD-10-CM

## 2017-02-27 RX ORDER — SULFAMETHOXAZOLE/TRIMETHOPRIM 800-160 MG
TABLET ORAL
Qty: 20 TABLET | Refills: 0 | Status: SHIPPED | OUTPATIENT
Start: 2017-02-27 | End: 2024-07-16

## 2017-02-27 NOTE — TELEPHONE ENCOUNTER
Anne,     Based upon Staph Aureus growth, let's have her use Bactrim DS for 10 days and then come in for recullture.     Thanks,     Dr. Catrachito Woodruff       Left detailed msg on patient's voice mail. Rx sent to pharmacy in chart and patient notified of this as well. Transferred to scheduling to schedule follow up with Dr. Woodruff.  Anne Layton, RN Care Coordinator  [facility] Otolaryngology/Head & Neck Surgery  Direct contact: [phone]

## 2017-03-16 ENCOUNTER — OFFICE VISIT (OUTPATIENT)
Dept: OTOLARYNGOLOGY | Facility: CLINIC | Age: 52
End: 2017-03-16

## 2017-03-16 DIAGNOSIS — H60.63 CHRONIC OTITIS EXTERNA OF BOTH EARS, UNSPECIFIED TYPE: Primary | ICD-10-CM

## 2017-03-16 ASSESSMENT — PAIN SCALES - GENERAL: PAINLEVEL: NO PAIN (0)

## 2017-03-16 NOTE — LETTER
3/16/2017       RE: Veronica Turk  [address] [facility] [address]     Dear Colleague,    Thank you for referring your patient, Veronica Turk, to the [facility] EAR NOSE AND THROAT at [facility]. Please see a copy of my visit note below.    The patient presents with a history of chronic otitis media and mastoiditis. She has previously undergone successful surgery with Dr. Moon Richter on her right ear. She recently developed an acute infection in the ear canals. The patient's therapy was adjusted based upon her culture results and she has responded very well to medical therapy. She reports that the irritation on the external aspect of the ears and in the ear canals has resolved and her hearing is improved. She completed a course of bactrim and this has resolved all infection in the ears canals. The patient is scheduled for a visit with dermatology to address the underlying skin issue.       All other systems were reviewed and they are either negative or they are not directly pertinent to this Otolaryngology examination.      Past Medical History:    Past Medical History   Diagnosis Date     Eczema      Hearing problem      Nasal congestion      Snoring      Sore throat      Tinnitus        Past Surgical History:    Past Surgical History   Procedure Laterality Date     Breast surgery       reduction     Orthopedic surgery       knee acl scope     Tympanoplasty  8/17/2011     Procedure:TYMPANOPLASTY; Posterior Auricular with facial nerve monitoring *Latex Safe* tympanoplasty with canalplasty; Surgeon:MOON RICHTER; Location: OR       Medications:      Current Outpatient Prescriptions:      sulfamethoxazole-trimethoprim (BACTRIM DS/SEPTRA DS) 800-160 MG per tablet, Take 1 tab PO BID x 10 days, Disp: 20 tablet, Rfl: 0     clotrimazole (LOTRIMIN) 1 % solution, Apply 2 drops to each ear canal BID X 1 month supply, Disp: 10 mL, Rfl: 1     clotrimazole  (LOTRIMIN) 1 % cream, Apply to affected area BID X 1 month supply, Disp: 2 g, Rfl: 1     tobramycin-dexamethasone (TOBRADEX) 0.3-0.1 % ophthalmic susp, Two drops to each ear BID X 14 days supply, Disp: 1 Bottle, Rfl: 0     triamcinolone (KENALOG) 0.025 % cream, Apply topically At Bedtime Apply to affected ear after completion of bactroban treatment., Disp: 80 g, Rfl: 5     Omega-3 Fatty Acids (OMEGA-3 FISH OIL PO), , Disp: , Rfl:      Sertraline HCl (ZOLOFT PO), Take 40 mg by mouth daily, Disp: , Rfl:      Multiple Vitamins-Minerals (MULTIVITAL PO), , Disp: , Rfl:      mupirocin (BACTROBAN) 2 % ointment, Apply a small amount to both ear 2 times a day for 10 days, Disp: 1 g, Rfl: 1     triamcinolone (KENALOG) 0.025 % cream, Please use thin layer bilateral ear canals opening at night to begin after 3 weeks of Triamcinolone at strength of 0.1%, Disp: 5 g, Rfl: 2     triamcinolone (KENALOG) 0.1 % cream, Use thin layer to bilatereal external auditory canal opening at night x 3 weeks, then stop, use 0.025 % strength as maintenance, Disp: 1 g, Rfl: 1    Allergies:    Ciprofloxacin and Ciprofloxacin    Physical Examination:    The patient is a well developed, well nourished female in no apparent distress.  She is normocephalic, atraumatic with pupils equally round and reactive to light.    Oral Cavity Examination:  Normal mucosa with no masses or lesions  Nasal Examination: Normal mucosa with no masses or lesions  Ear Examination: Ear canals are clear and the skin of the conchal bowls of the ears bilaterally are no longer crusted and scabbed. Tympanic membranes are intact and middle ear spaces free of infections or effusions.  Neurological Examination: Facial nerve function intact and symmetric  Integumentary Examination: No lesions on the skin of the head and neck    Assessment and Plan:    The patient presents with a history of bilateral otitis externa. Her therapy was adjusted based upon her culture results and she has  responded very well to Bactrim, Tobradex Drops, Lotrimin Cream and Lotrimin Drops. The patient has been referred to Dermatology to assess the cause of the skin irritation on the ears that is leading to these ear canal infections. She will complete the use of her medications in the interim.     CC: Dr. Moon Richter      Again, thank you for allowing me to participate in the care of your patient.      Sincerely,    Catrachito Woodruff MD

## 2017-03-16 NOTE — PROGRESS NOTES
The patient presents with a history of chronic otitis media and mastoiditis. She has previously undergone successful surgery with Dr. Moon Richter on her right ear. She recently developed an acute infection in the ear canals. The patient's therapy was adjusted based upon her culture results and she has responded very well to medical therapy. She reports that the irritation on the external aspect of the ears and in the ear canals has resolved and her hearing is improved. She completed a course of bactrim and this has resolved all infection in the ears canals. The patient is scheduled for a visit with dermatology to address the underlying skin issue.       All other systems were reviewed and they are either negative or they are not directly pertinent to this Otolaryngology examination.      Past Medical History:    Past Medical History   Diagnosis Date     Eczema      Hearing problem      Nasal congestion      Snoring      Sore throat      Tinnitus        Past Surgical History:    Past Surgical History   Procedure Laterality Date     Breast surgery       reduction     Orthopedic surgery       knee acl scope     Tympanoplasty  8/17/2011     Procedure:TYMPANOPLASTY; Posterior Auricular with facial nerve monitoring *Latex Safe* tympanoplasty with canalplasty; Surgeon:MOON RICHTER; Location: OR       Medications:      Current Outpatient Prescriptions:      sulfamethoxazole-trimethoprim (BACTRIM DS/SEPTRA DS) 800-160 MG per tablet, Take 1 tab PO BID x 10 days, Disp: 20 tablet, Rfl: 0     clotrimazole (LOTRIMIN) 1 % solution, Apply 2 drops to each ear canal BID X 1 month supply, Disp: 10 mL, Rfl: 1     clotrimazole (LOTRIMIN) 1 % cream, Apply to affected area BID X 1 month supply, Disp: 2 g, Rfl: 1     tobramycin-dexamethasone (TOBRADEX) 0.3-0.1 % ophthalmic susp, Two drops to each ear BID X 14 days supply, Disp: 1 Bottle, Rfl: 0     triamcinolone (KENALOG) 0.025 % cream, Apply topically At Bedtime Apply to  affected ear after completion of bactroban treatment., Disp: 80 g, Rfl: 5     Omega-3 Fatty Acids (OMEGA-3 FISH OIL PO), , Disp: , Rfl:      Sertraline HCl (ZOLOFT PO), Take 40 mg by mouth daily, Disp: , Rfl:      Multiple Vitamins-Minerals (MULTIVITAL PO), , Disp: , Rfl:      mupirocin (BACTROBAN) 2 % ointment, Apply a small amount to both ear 2 times a day for 10 days, Disp: 1 g, Rfl: 1     triamcinolone (KENALOG) 0.025 % cream, Please use thin layer bilateral ear canals opening at night to begin after 3 weeks of Triamcinolone at strength of 0.1%, Disp: 5 g, Rfl: 2     triamcinolone (KENALOG) 0.1 % cream, Use thin layer to bilatereal external auditory canal opening at night x 3 weeks, then stop, use 0.025 % strength as maintenance, Disp: 1 g, Rfl: 1    Allergies:    Ciprofloxacin and Ciprofloxacin    Physical Examination:    The patient is a well developed, well nourished female in no apparent distress.  She is normocephalic, atraumatic with pupils equally round and reactive to light.    Oral Cavity Examination:  Normal mucosa with no masses or lesions  Nasal Examination: Normal mucosa with no masses or lesions  Ear Examination: Ear canals are clear and the skin of the conchal bowls of the ears bilaterally are no longer crusted and scabbed. Tympanic membranes are intact and middle ear spaces free of infections or effusions.  Neurological Examination: Facial nerve function intact and symmetric  Integumentary Examination: No lesions on the skin of the head and neck    Assessment and Plan:    The patient presents with a history of bilateral otitis externa. Her therapy was adjusted based upon her culture results and she has responded very well to Bactrim, Tobradex Drops, Lotrimin Cream and Lotrimin Drops. The patient has been referred to Dermatology to assess the cause of the skin irritation on the ears that is leading to these ear canal infections. She will complete the use of her medications in the interim.     CC:  Dr. Moon Richter

## 2017-03-16 NOTE — MR AVS SNAPSHOT
After Visit Summary   3/16/2017    Veronica Turk    MRN: [id number]           Patient Information     Date Of Birth          1965        Visit Information        Provider Department      3/16/2017 8:45 AM Catrachito Woodruff MD [facility] Ear Nose and Throat        Today's Diagnoses     Chronic otitis externa of both ears, unspecified type    -  1      Care Instructions    The patient presents with a history of bilateral otitis externa. Her therapy was adjusted based upon her culture results and she has responded very well to Bactrim, Tobradex Drops, Lotrimin Cream and Lotrimin Drops. The patient has been referred to Dermatology to assess the cause of the skin irritation on the ears that is leading to these ear canal infections. She will complete the use of her medications in the interim.         Follow-ups after your visit        Your next 10 appointments already scheduled     Mar 28, 2017  8:45 AM CDT   (Arrive by 8:30 AM)   New Patient Visit with Hermes Lundy MD   [facility] Dermatology ([facility])    [address] [place] [id number] [phone]              Who to contact     Please call your clinic at [phone] to:    Ask questions about your health    Make or cancel appointments    Discuss your medicines    Learn about your test results    Speak to your doctor   If you have compliments or concerns about an experience at your clinic, or if you wish to file a complaint, please contact [facility] Patient Relations at [phone] or email us at [email or web address]         Additional Information About Your Visit        MyChart Information     [employer]Â® is an electronic gateway that provides easy, online access to your medical records. With [employer]Â®, you can request a clinic appointment, read your test results, renew a prescription or communicate with your care team.     To sign up for  [employer] visit the website at [email or web address]   You will be asked to enter the access code listed below, as well as some personal information. Please follow the directions to create your username and password.     Your access code is: [email or web address]  Expires: 5/3/2017 12:25 PM     Your access code will  in 90 days. If you need help or a new code, please contact your [facility] or call [phone] for assistance.        Care EveryWhere ID     This is your Care EveryWhere ID. This could be used by other organizations to access your [facility] medical records  [id number]         Blood Pressure from Last 3 Encounters:   11 112/71    Weight from Last 3 Encounters:   17 101.6 kg (224 lb)   17 100.2 kg (221 lb)   16 98.6 kg (217 lb 4.8 oz)              Today, you had the following     No orders found for display       Primary Care Provider Office Phone # Fax #    Chani Ghotra [phone]       [facility] [address] [address]        Thank you!     Thank you for choosing [facility] EAR NOSE AND THROAT  for your care. Our goal is always to provide you with excellent care. Hearing back from our patients is one way we can continue to improve our services. Please take a few minutes to complete the written survey that you may receive in the mail after your visit with us. Thank you!             Your Updated Medication List - Protect others around you: Learn how to safely use, store and throw away your medicines at www.disposemymeds.org.          This list is accurate as of: 3/16/17  8:49 AM.  Always use your most recent med list.                   Brand Name Dispense Instructions for use    * clotrimazole 1 % solution    LOTRIMIN    10 mL    Apply 2 drops to each ear canal BID X 1 month supply       * clotrimazole 1 % cream    LOTRIMIN    2 g    Apply to affected area BID X 1 month supply       MULTIVITAL PO           mupirocin 2 % ointment    BACTROBAN    1 g    Apply a small amount to both ear 2 times a day for 10 days       OMEGA-3 FISH OIL PO          sulfamethoxazole-trimethoprim 800-160 MG per tablet    BACTRIM DS/SEPTRA DS    20 tablet    Take 1 tab PO BID x 10 days       tobramycin-dexamethasone 0.3-0.1 % ophthalmic susp    TOBRADEX    1 Bottle    Two drops to each ear BID X 14 days supply       * triamcinolone 0.025 % cream    KENALOG    5 g    Please use thin layer bilateral ear canals opening at night to begin after 3 weeks of Triamcinolone at strength of 0.1%       * triamcinolone 0.1 % cream    KENALOG    1 g    Use thin layer to bilatereal external auditory canal opening at night x 3 weeks, then stop, use 0.025 % strength as maintenance       * triamcinolone 0.025 % cream    KENALOG    80 g    Apply topically At Bedtime Apply to affected ear after completion of bactroban treatment.       ZOLOFT PO      Take 40 mg by mouth daily       * Notice:  This list has 5 medication(s) that are the same as other medications prescribed for you. Read the directions carefully, and ask your doctor or other care provider to review them with you.

## 2017-03-16 NOTE — PATIENT INSTRUCTIONS
The patient presents with a history of bilateral otitis externa. Her therapy was adjusted based upon her culture results and she has responded very well to Bactrim, Tobradex Drops, Lotrimin Cream and Lotrimin Drops. The patient has been referred to Dermatology to assess the cause of the skin irritation on the ears that is leading to these ear canal infections. She will complete the use of her medications in the interim.

## 2017-03-24 LAB
FUNGUS SPEC CULT: NORMAL
MICRO REPORT STATUS: NORMAL
SPECIMEN SOURCE: NORMAL

## 2017-03-28 ENCOUNTER — OFFICE VISIT (OUTPATIENT)
Dept: DERMATOLOGY | Facility: CLINIC | Age: 52
End: 2017-03-28

## 2017-03-28 DIAGNOSIS — L24.9 IRRITANT DERMATITIS: Primary | ICD-10-CM

## 2017-03-28 RX ORDER — HYDROCORTISONE 25 MG/G
OINTMENT TOPICAL 2 TIMES DAILY
Qty: 30 G | Refills: 3 | Status: SHIPPED | OUTPATIENT
Start: 2017-03-28 | End: 2024-07-16

## 2017-03-28 ASSESSMENT — PAIN SCALES - GENERAL: PAINLEVEL: NO PAIN (0)

## 2017-03-28 NOTE — LETTER
"3/28/2017       RE: Veronica Turk  [address] [facility] [address]     Dear Colleague,    Thank you for referring your patient, Veronica Turk, to the [facility] DERMATOLOGY at [facility]. Please see a copy of my visit note below.    [facility] Dermatology Note      Dermatology Problem List:  1. External ear dermatitis    Encounter Date: Mar 28, 2017    CC:   Chief Complaint   Patient presents with     Derm Problem     Veronica is here today for eczema on both outer ears.  Has it for about \"10 years\" and causes her \"constant itchy ear infections.\"           History of Present Illness:  Ms. Veronica Turk is a 51 year old female who presents with recurrent ear infections complicated by an external ear dermatitis, currently being treated with lotrimin drop and cream; she had previously been using tobramycin drops until about 1-1.5 months ago. The rash has been persistent for about 13 years. She has had TrueTest patch testing, but this does not include many preservative allergens.     Past Medical History:   Patient Active Problem List   Diagnosis     Ear pain     Past Medical History:   Diagnosis Date     Eczema      Hearing problem      Nasal congestion      Snoring      Sore throat      Tinnitus      Past Surgical History:   Procedure Laterality Date     BREAST SURGERY      reduction     ORTHOPEDIC SURGERY      knee acl scope     TYMPANOPLASTY  8/17/2011    Procedure:TYMPANOPLASTY; Posterior Auricular with facial nerve monitoring *Latex Safe* tympanoplasty with canalplasty; Surgeon:DIANNA JOHNSTON; Location: OR       Social History:  The patient works.    Family History:  No family history of skin cancer. Some family history of hand dermatitis.     Medications:  Current Outpatient Prescriptions   Medication Sig Dispense Refill     clotrimazole (LOTRIMIN) 1 % solution Apply 2 drops to each ear canal BID X 1 month supply 10 mL " 1     clotrimazole (LOTRIMIN) 1 % cream Apply to affected area BID X 1 month supply 2 g 1     Omega-3 Fatty Acids (OMEGA-3 FISH OIL PO)        Sertraline HCl (ZOLOFT PO) Take 40 mg by mouth daily       Multiple Vitamins-Minerals (MULTIVITAL PO)        sulfamethoxazole-trimethoprim (BACTRIM DS/SEPTRA DS) 800-160 MG per tablet Take 1 tab PO BID x 10 days (Patient not taking: Reported on 3/28/2017) 20 tablet 0     tobramycin-dexamethasone (TOBRADEX) 0.3-0.1 % ophthalmic susp Two drops to each ear BID X 14 days supply (Patient not taking: Reported on 3/28/2017) 1 Bottle 0     triamcinolone (KENALOG) 0.025 % cream Apply topically At Bedtime Apply to affected ear after completion of bactroban treatment. (Patient not taking: Reported on 3/28/2017) 80 g 5     mupirocin (BACTROBAN) 2 % ointment Apply a small amount to both ear 2 times a day for 10 days (Patient not taking: Reported on 3/28/2017) 1 g 1     triamcinolone (KENALOG) 0.025 % cream Please use thin layer bilateral ear canals opening at night to begin after 3 weeks of Triamcinolone at strength of 0.1% (Patient not taking: Reported on 3/28/2017) 5 g 2     triamcinolone (KENALOG) 0.1 % cream Use thin layer to bilatereal external auditory canal opening at night x 3 weeks, then stop, use 0.025 % strength as maintenance (Patient not taking: Reported on 3/28/2017) 1 g 1        Allergies   Allergen Reactions     Ciprofloxacin Hives     Ciprofloxacin Rash and Hives         Review of Systems:  -As per HPI  -Constitutional: The patient denies fatigue, fevers, chills, unintended weight loss, and night sweats.  -HEENT: Patient denies nonhealing oral sores.  -Skin: As above in HPI. No additional skin concerns.    Physical exam:  Vitals: There were no vitals taken for this visit.  GEN: This is a well developed, well-nourished female in no acute distress, in a pleasant mood.    SKIN: Focused examination of the head, neck and ears was performed.  -On the right cymba of the ear is a  flesh colored papule consistent with a clogged pore  -Erythema and slight scaling with tightness of the skin of the external ears in the conchal bowls bilaterally consistent with a contact dermatitis  -Ring finger nail on R hand dystrophic s/p trauma, regrowing.   -No other lesions of concern on areas examined.     Impression/Plan:  1. Ear dermatitis. Consistent with a contact dermatitis, irritant more likely than allergic. She has had patch testing done before, but the TrueTest does not include preservatives such as benzalkonium chloride, as is found in the tobramycin drops. She last used those about a month ago though, and her dermatitis extended well before their use. Other considerations include psoriasis (but she has no other lesions), seb derm (although she has just mild dandruff), and eczema (no other history of eczema). The clinical appearance is more consistent with contact dermatitis.     Add hydrocortisone 2.5% ointment BID, apply gently to external ear with a Q-tip    CC Catrachito Burciaga on close of this encounter.  Follow-up in 1 months, earlier for new or changing lesions.     Dr. Lundy staffed the patient.    Staff Involved:  Resident(Lesley Spears)/Staff(as above)    __________________________  Lesley Spears MD  Medicine/Dermatology PGY-2  p [phone]    Staff Physician Comments:   I saw and evaluated the patient with the resident and I agree with the assessment and plan.  I was present for the examination.    Hermes Lundy MD  Dermatology Staff Physician  , Department of Dermatology      Again, thank you for allowing me to participate in the care of your patient.      Sincerely,    Hermes Lundy MD

## 2017-03-28 NOTE — MR AVS SNAPSHOT
After Visit Summary   3/28/2017    Veronica Turk    MRN: [id number]           Patient Information     Date Of Birth          1965        Visit Information        Provider Department      3/28/2017 8:45 AM Hermes Lundy MD [facility] Dermatology        Today's Diagnoses     Irritant dermatitis    -  1      Care Instructions    The eruption in your ears looks like a dermatitis. We worry about a contact dermatitis. It could be irritant or allergic.     -Stop tobrodex drops (contains benzalkonium chloride)  -Apply hydrocortisone 2.5% ointment twice a day to the ears, apply with a Q tip. Do not go deep into the ear, just roll on the ointment to the external ear. The dermatits does not appear to go deep into the ear.         Follow-ups after your visit        Follow-up notes from your care team     Return in about 4 weeks (around 4/25/2017).      Who to contact     Please call your clinic at [phone] to:    Ask questions about your health    Make or cancel appointments    Discuss your medicines    Learn about your test results    Speak to your doctor   If you have compliments or concerns about an experience at your clinic, or if you wish to file a complaint, please contact [facility] Patient Relations at [phone] or email us at [email or web address]         Additional Information About Your Visit        Care EveryWhere ID     This is your Care EveryWhere ID. This could be used by other organizations to access your [place] medical records  [id number]         Blood Pressure from Last 3 Encounters:   08/17/11 112/71    Weight from Last 3 Encounters:   02/24/17 101.6 kg (224 lb)   02/02/17 100.2 kg (221 lb)   08/18/16 98.6 kg (217 lb 4.8 oz)              Today, you had the following     No orders found for display         Today's Medication Changes          These changes are accurate as of: 3/28/17  9:14 AM.  If you have any questions, ask your  nurse or doctor.               Start taking these medicines.        Dose/Directions    hydrocortisone 2.5 % ointment   Used for:  Irritant dermatitis   Started by:  Hermes Lundy MD        Apply topically 2 times daily   Quantity:  30 g   Refills:  3            Where to get your medicines      These medications were sent to [facility] [id number] [facility] - [place], [place] - [address]  [address] [address]     Phone:  [phone]     hydrocortisone 2.5 % ointment                Primary Care Provider Office Phone # Fax #    Chani Ghotra [phone]       [facility] [address]  [facility] [address]        Thank you!     Thank you for choosing [facility] DERMATOLOGY  for your care. Our goal is always to provide you with excellent care. Hearing back from our patients is one way we can continue to improve our services. Please take a few minutes to complete the written survey that you may receive in the mail after your visit with us. Thank you!             Your Updated Medication List - Protect others around you: Learn how to safely use, store and throw away your medicines at www.disposemymeds.org.          This list is accurate as of: 3/28/17  9:14 AM.  Always use your most recent med list.                   Brand Name Dispense Instructions for use    * clotrimazole 1 % solution    LOTRIMIN    10 mL    Apply 2 drops to each ear canal BID X 1 month supply       * clotrimazole 1 % cream    LOTRIMIN    2 g    Apply to affected area BID X 1 month supply       hydrocortisone 2.5 % ointment     30 g    Apply topically 2 times daily       MULTIVITAL PO          mupirocin 2 % ointment    BACTROBAN    1 g    Apply a small amount to both ear 2 times a day for 10 days       OMEGA-3 FISH OIL PO          sulfamethoxazole-trimethoprim 800-160 MG per tablet    BACTRIM DS/SEPTRA DS    20 tablet    Take 1 tab PO BID x 10 days       tobramycin-dexamethasone 0.3-0.1 %  ophthalmic susp    TOBRADEX    1 Bottle    Two drops to each ear BID X 14 days supply       * triamcinolone 0.025 % cream    KENALOG    5 g    Please use thin layer bilateral ear canals opening at night to begin after 3 weeks of Triamcinolone at strength of 0.1%       * triamcinolone 0.1 % cream    KENALOG    1 g    Use thin layer to bilatereal external auditory canal opening at night x 3 weeks, then stop, use 0.025 % strength as maintenance       * triamcinolone 0.025 % cream    KENALOG    80 g    Apply topically At Bedtime Apply to affected ear after completion of bactroban treatment.       ZOLOFT PO      Take 40 mg by mouth daily       * Notice:  This list has 5 medication(s) that are the same as other medications prescribed for you. Read the directions carefully, and ask your doctor or other care provider to review them with you.

## 2017-03-28 NOTE — PROGRESS NOTES
"[facility] Dermatology Note      Dermatology Problem List:  1. External ear dermatitis    Encounter Date: Mar 28, 2017    CC:   Chief Complaint   Patient presents with     Derm Problem     Veronica is here today for eczema on both outer ears.  Has it for about \"10 years\" and causes her \"constant itchy ear infections.\"           History of Present Illness:  Ms. Veronica Turk is a 51 year old female who presents with recurrent ear infections complicated by an external ear dermatitis, currently being treated with lotrimin drop and cream; she had previously been using tobramycin drops until about 1-1.5 months ago. The rash has been persistent for about 13 years. She has had TrueTest patch testing, but this does not include many preservative allergens.     Past Medical History:   Patient Active Problem List   Diagnosis     Ear pain     Past Medical History:   Diagnosis Date     Eczema      Hearing problem      Nasal congestion      Snoring      Sore throat      Tinnitus      Past Surgical History:   Procedure Laterality Date     BREAST SURGERY      reduction     ORTHOPEDIC SURGERY      knee acl scope     TYMPANOPLASTY  8/17/2011    Procedure:TYMPANOPLASTY; Posterior Auricular with facial nerve monitoring *Latex Safe* tympanoplasty with canalplasty; Surgeon:DIANNA JOHNSTON; Location: OR       Social History:  The patient works.    Family History:  No family history of skin cancer. Some family history of hand dermatitis.     Medications:  Current Outpatient Prescriptions   Medication Sig Dispense Refill     clotrimazole (LOTRIMIN) 1 % solution Apply 2 drops to each ear canal BID X 1 month supply 10 mL 1     clotrimazole (LOTRIMIN) 1 % cream Apply to affected area BID X 1 month supply 2 g 1     Omega-3 Fatty Acids (OMEGA-3 FISH OIL PO)        Sertraline HCl (ZOLOFT PO) Take 40 mg by mouth daily       Multiple Vitamins-Minerals (MULTIVITAL PO)        sulfamethoxazole-trimethoprim (BACTRIM " DS/SEPTRA DS) 800-160 MG per tablet Take 1 tab PO BID x 10 days (Patient not taking: Reported on 3/28/2017) 20 tablet 0     tobramycin-dexamethasone (TOBRADEX) 0.3-0.1 % ophthalmic susp Two drops to each ear BID X 14 days supply (Patient not taking: Reported on 3/28/2017) 1 Bottle 0     triamcinolone (KENALOG) 0.025 % cream Apply topically At Bedtime Apply to affected ear after completion of bactroban treatment. (Patient not taking: Reported on 3/28/2017) 80 g 5     mupirocin (BACTROBAN) 2 % ointment Apply a small amount to both ear 2 times a day for 10 days (Patient not taking: Reported on 3/28/2017) 1 g 1     triamcinolone (KENALOG) 0.025 % cream Please use thin layer bilateral ear canals opening at night to begin after 3 weeks of Triamcinolone at strength of 0.1% (Patient not taking: Reported on 3/28/2017) 5 g 2     triamcinolone (KENALOG) 0.1 % cream Use thin layer to bilatereal external auditory canal opening at night x 3 weeks, then stop, use 0.025 % strength as maintenance (Patient not taking: Reported on 3/28/2017) 1 g 1        Allergies   Allergen Reactions     Ciprofloxacin Hives     Ciprofloxacin Rash and Hives         Review of Systems:  -As per HPI  -Constitutional: The patient denies fatigue, fevers, chills, unintended weight loss, and night sweats.  -HEENT: Patient denies nonhealing oral sores.  -Skin: As above in HPI. No additional skin concerns.    Physical exam:  Vitals: There were no vitals taken for this visit.  GEN: This is a well developed, well-nourished female in no acute distress, in a pleasant mood.    SKIN: Focused examination of the head, neck and ears was performed.  -On the right cymba of the ear is a flesh colored papule consistent with a clogged pore  -Erythema and slight scaling with tightness of the skin of the external ears in the conchal bowls bilaterally consistent with a contact dermatitis  -Ring finger nail on R hand dystrophic s/p trauma, regrowing.   -No other lesions of  concern on areas examined.     Impression/Plan:  1. Ear dermatitis. Consistent with a contact dermatitis, irritant more likely than allergic. She has had patch testing done before, but the TrueTest does not include preservatives such as benzalkonium chloride, as is found in the tobramycin drops. She last used those about a month ago though, and her dermatitis extended well before their use. Other considerations include psoriasis (but she has no other lesions), seb derm (although she has just mild dandruff), and eczema (no other history of eczema). The clinical appearance is more consistent with contact dermatitis.     Add hydrocortisone 2.5% ointment BID, apply gently to external ear with a Q-tip    CC Catrachito Burciaga on close of this encounter.  Follow-up in 1 months, earlier for new or changing lesions.     Dr. Lundy staffed the patient.    Staff Involved:  Resident(Lesley Spears)/Staff(as above)    __________________________  Lesley Spears MD  Medicine/Dermatology PGY-2  p [phone]    Staff Physician Comments:   I saw and evaluated the patient with the resident and I agree with the assessment and plan.  I was present for the examination.    Hermes Lundy MD  Dermatology Staff Physician  , Department of Dermatology

## 2017-03-28 NOTE — PATIENT INSTRUCTIONS
The eruption in your ears looks like a dermatitis. We worry about a contact dermatitis. It could be irritant or allergic.     -Stop tobrodex drops (contains benzalkonium chloride)  -Apply hydrocortisone 2.5% ointment twice a day to the ears, apply with a Q tip. Do not go deep into the ear, just roll on the ointment to the external ear. The dermatits does not appear to go deep into the ear.

## 2017-03-28 NOTE — NURSING NOTE
"Dermatology Rooming Note    Veronica Turk's goals for this visit include:   Chief Complaint   Patient presents with     Derm Problem     Veronica is here today for eczema on both outer ears.  Has it for about \"10 years\" and causes her \"constant itchy ear infections.\"         Eula Westbrook, CHIRAG    "

## 2018-06-20 ASSESSMENT — MIFFLIN-ST. JEOR: SCORE: 1533.58

## 2018-06-21 ENCOUNTER — ANESTHESIA - HEALTHEAST (OUTPATIENT)
Dept: SURGERY | Facility: AMBULATORY SURGERY CENTER | Age: 53
End: 2018-06-21

## 2018-06-22 ENCOUNTER — SURGERY - HEALTHEAST (OUTPATIENT)
Dept: SURGERY | Facility: AMBULATORY SURGERY CENTER | Age: 53
End: 2018-06-22

## 2018-06-22 ASSESSMENT — MIFFLIN-ST. JEOR: SCORE: 1533.58

## 2021-06-01 VITALS — HEIGHT: 66 IN | WEIGHT: 203 LBS | BODY MASS INDEX: 32.62 KG/M2

## 2021-06-18 NOTE — ANESTHESIA PREPROCEDURE EVALUATION
Anesthesia Evaluation      Patient summary reviewed   No history of anesthetic complications     Airway   Mallampati: II  Neck ROM: full   Pulmonary - negative ROS and normal exam   (-) not a smoker (Former smoker)                         Cardiovascular - normal exam  (+) dysrhythmias (Irregular hearbeat. Skipped beats.), ,      Neuro/Psych    (+) depression,     Comments: Scoliosis    Endo/Other    (+) obesity (BMI 33),      Comments: Hx dry eyes and blepharitis (Meibomian gland dz)    GI/Hepatic/Renal - negative ROS   (-) GERD     Other findings: Varicose veins          Preg:      Dental - normal exam                        Anesthesia Plan  Planned anesthetic: MAC  Glycopyrrolate 0.2 mg IV  Propofol IV sedation  Ketamine IV PRN  Avoid fentanyl  Decadron/Zofran  GA PRN  ASA 2     Anesthetic plan and risks discussed with: patient  Anesthesia plan special considerations: antiemetics,   Post-op plan: routine recovery

## 2021-06-18 NOTE — ANESTHESIA POSTPROCEDURE EVALUATION
Patient: Veronica Turk  EXAM UNDER ANESTHESIA WITH EXCISION OF SKIN TAG  Anesthesia type: MAC    Patient location: PACU  Last vitals:   Vitals:    06/22/18 1430   BP: 121/58   Pulse: 71   Resp:    Temp:    SpO2: 98%     Post vital signs: stable  Level of consciousness: awake and responds to simple questions  Post-anesthesia pain: pain controlled  Post-anesthesia nausea and vomiting: no  Pulmonary: unassisted, return to baseline  Cardiovascular: stable and blood pressure at baseline  Hydration: adequate  Anesthetic events: no    QCDR Measures:  ASA# 11 - Yas-op Cardiac Arrest: ASA11B - Patient did NOT experience unanticipated cardiac arrest  ASA# 12 - Yas-op Mortality Rate: ASA12B - Patient did NOT die  ASA# 13 - PACU Re-Intubation Rate: NA - No ETT / LMA used for case  ASA# 10 - Composite Anes Safety: ASA10A - No serious adverse event    Additional Notes:

## 2021-06-18 NOTE — ANESTHESIA CARE TRANSFER NOTE
Last vitals:   Vitals:    06/22/18 1319   BP: 113/52   Pulse: 75   Resp: 16   Temp: 37  C (98.6  F)   SpO2: 100%     Patient's level of consciousness is drowsy  Spontaneous respirations: yes  Maintains airway independently: yes  Dentition unchanged: yes  Oropharynx: oropharynx clear of all foreign objects    QCDR Measures:  ASA# 20 - Surgical Safety Checklist: WHO surgical safety checklist completed prior to induction  PQRS# 430 - Adult PONV Prevention: 4558F - Pt received => 2 anti-emetic agents (different classes) preop & intraop  ASA# 8 - Peds PONV Prevention: NA - Not pediatric patient, not GA or 2 or more risk factors NOT present  PQRS# 424 - Yas-op Temp Management: 4559F - At least one body temp DOCUMENTED => 35.5C or 95.9F within required timeframe  PQRS# 426 - PACU Transfer Protocol: - Transfer of care checklist used  ASA# 14 - Acute Post-op Pain: ASA14B - Patient did NOT experience pain >= 7 out of 10

## 2024-02-27 ENCOUNTER — TRANSFERRED RECORDS (OUTPATIENT)
Dept: HEALTH INFORMATION MANAGEMENT | Facility: CLINIC | Age: 59
End: 2024-02-27
Payer: COMMERCIAL

## 2024-03-19 ENCOUNTER — PRE VISIT (OUTPATIENT)
Dept: ORTHOPEDICS | Facility: CLINIC | Age: 59
End: 2024-03-19
Payer: COMMERCIAL

## 2024-03-19 NOTE — TELEPHONE ENCOUNTER
Action March 19, 2024 4:10 PM MT   Action Taken Sent a request for imaging from [facility] and [facility].       DIAGNOSIS: Hip Replacement Consult   APPOINTMENT DATE: 03/25/2024   NOTES STATUS DETAILS   OFFICE NOTE from referring provider SELF    OFFICE NOTE from other specialist Care Everywhere [facility]  [facility]  [facility]   DISCHARGE REPORT from the ER Care Everywhere 03/22/2023 - [facility]   INJECTIONS DONE IN RADIOLOGY PACS [facility]:  08/29/2023 - LT Hip  05/18/2023 - L Spine   ULTRASOUND PACS [facility]:  03/22/2023 - Left Lower Extremity   MRI PACS [facility]:  07/05/2023 - L Spine   CT SCAN PACS [facility]:  10/29/2023 - Abd/Pel   XRAYS (IMAGES & REPORTS) PACS [facility]:  04/14/2023 - Hip/Pelvis

## 2024-03-22 DIAGNOSIS — M25.552 LEFT HIP PAIN: Primary | ICD-10-CM

## 2024-03-22 NOTE — PROGRESS NOTES
[facility]  Orthopaedic Surgery, Joint Replacement Consultation  by Phil Hopkins M.D.    Veronica Turk MRN# [id number]    YOB: 1965     Requesting physician: Chani Forte PA-C, pain and palliation service, [facility]  Rosalind Engel,, CNP, [facility] orthopedics  Randy Alvarado MD, sports medicine, [facility]              Assessment and Plan:   Assessment:  Severe degenerative disease of left hip joint with complete loss of cartilage thickness superiorly and progression over the past 12 months.  Symptoms are intractable despite previous steroid injections.     Plan:  In order to obtain complete pain relief, this could only be achieved through proceeding with left total hip arthroplasty.  She has exhausted nonsurgical measures.  I do not advise any interventions for her lumbar spine at this time.    We discussed the risk benefits alternatives to total hip arthroplasty surgery, the expected outcomes and potential complications.  We discussed the different techniques for performing surgery.  In my opinion she is best served by undergoing a posterior approach total hip arthroplasty as a posterior a direct anterior approach.      We also discussed measures that she can take to improve her pre and postoperative care.   Preoperative dental evaluation, preoperative joint replacement class, obtaining assistive devices and preparing her home for temporary disability would be in her best interest along with preanesthetic clinic consultation.    She has had a prior ACL reconstruction of her right knee joint as well as arthroscopic procedures on both knees.      MD Shanna Haq Family Professor  Oncology and Adult Reconstructive Surgery  Dept Orthopaedic Surgery, [facility]  [phone] office, [phone] pager  [email or web address]       For additional information and frequently asked questions regarding joint  "replacements, scan the QR code image below on your phone camera or go to:  [email or web address]              History of Present Illness:   58 year old female  chief complaint    This patient is having marked discomfort involving her left groin and thigh area with some radicular symptoms down to her foot.  She has a known lumbar radiculopathy and has been working with the pain and palliation and sports medicine services at [facility] to address her symptoms.  Differential steroid injections into the lumbar spine and hip area were administered.  None were of any substantial benefit to her.  She is also undergone physical therapy and other nonsurgical measures such as chiropractic care and different types of \"wave or light therapy\".    Current symptoms:  Problem: Left hip OA   Onset and duration: Has been a few years but had a recent flare up a year ago   Awakens from sleep due to sx's:  No  Precipitating Injury:  No    Other joints or sites painful:  Yes  Prior treatments:  Non steroidal anti-inflammatory agents or aspirin: Yes  Reduced activity:  No  Physical therapy:  No  Chiropractic care:  Yes  Injections with either steroid or viscosupplementation agents:  Yes           Physical Exam:     EXAMINATION pertinent findings:   PSYCH: Pleasant, healthy-appearing, alert, oriented x3, cooperative. Normal mood and affect.  VITAL SIGNS: Height 1.675 m (5' 5.95\"), weight 103 kg (227 lb)..  Reviewed nursing intake notes.   Body mass index is 36.7 kg/m .  RESP: non labored breathing   ABD: benign, soft, non-tender, no acute peritoneal findings  SKIN: grossly normal   LYMPHATIC: grossly normal, no adenopathy, no extremity edema  NEURO: grossly normal , no motor deficits  VASCULAR: satisfactory perfusion of all extremities   MUSCULOSKELETAL:   Minimally antalgic gait.  Severely restricted range of motion left hip joint with 0 to 90 degrees flexion, 0 degrees internal rotation, 20 degrees " external rotation, abduction 30 adduction 20.  Right hip joint has full range of motion with 0 to 120 degrees flexion, 20 degrees internal rotation, 60 degrees external rotation, abduction 60 adduction 20.    Both knees have full range of motion.           Data:   All laboratory data reviewed  All imaging studies reviewed by me    Imaging tests:  XR, MRI,     Left hip shows evidence of complete loss of cartilage thickness superiorly with subchondral sclerosis present.  This has progressed since 2023 a year ago.  Some osteophyte formation noted as well.  Kellgren-Ed grading:    grade 4 (severe): large osteophytes, marked narrowing of joint space, severe sclerosis and definite deformity of bone ends      DATA for DOCUMENTATION:         Past Medical History:     Patient Active Problem List   Diagnosis    Ear pain     Past Medical History:   Diagnosis Date    Eczema     Hearing problem     Nasal congestion     Snoring     Sore throat     Tinnitus        Also see scanned health assessment forms.       Past Surgical History:     Past Surgical History:   Procedure Laterality Date    ANTERIOR CRUCIATE LIGAMENT REPAIR Right     ARTHROSCOPY KNEE Bilateral     BREAST SURGERY      reduction    BREAST SURGERY      FISSURECTOMY RECTUM N/A 6/22/2018    Procedure: EXAM UNDER ANESTHESIA WITH EXCISION OF SKIN TAG;  Surgeon: Bessy Carrillo MD;  Location: [facility];  Service:     MAMMOPLASTY REDUCTION Bilateral     MAMMOPLASTY REDUCTION Bilateral     ORTHOPEDIC SURGERY      knee acl scope    OTHER SURGICAL HISTORY Right     Rt hand reconstruction    TYMPANOPLASTY  8/17/2011    Procedure:TYMPANOPLASTY; Posterior Auricular with facial nerve monitoring *Latex Safe* tympanoplasty with canalplasty; Surgeon:DIANNA JOHNSTON; Location:[facility]            Social History:     Social History     Socioeconomic History    Marital status:      Spouse name: Not on file    Number of children: Not on file    Years of  education: Not on file    Highest education level: Not on file   Occupational History    Not on file   Tobacco Use    Smoking status: Some Days     Years: 10     Types: Cigarettes    Smokeless tobacco: Never   Substance and Sexual Activity    Alcohol use: Yes     Alcohol/week: 3.0 standard drinks of alcohol     Comment: Alcoholic Drinks/day: 3 days a week    Drug use: No    Sexual activity: Yes     Partners: Male     Birth control/protection: Male Surgical   Other Topics Concern    Parent/sibling w/ CABG, MI or angioplasty before 65F 55M? Not Asked   Social History Narrative    Not on file     Social Determinants of Health     Financial Resource Strain: Not on file   Food Insecurity: Not on file   Transportation Needs: Not on file   Physical Activity: Not on file   Stress: Not on file   Social Connections: Not on file   Interpersonal Safety: Not on file   Housing Stability: Not on file            Family History:       Family History   Problem Relation Age of Onset    Depression Brother     Asthma Son     Asthma Son     Stomach Problem Son     Asthma Niece     Asthma Nephew     Cancer Father     Stomach Problem Father     Depression Other     Skin Cancer No family hx of     Melanoma No family hx of             Medications:     Current Outpatient Medications   Medication Sig    Multiple Vitamins-Minerals (MULTIVITAL PO)     NALTREXONE HCL PO     progesterone (PROMETRIUM) 100 MG capsule Take 100 mg by mouth daily    TURMERIC PO     clotrimazole (LOTRIMIN) 1 % cream Apply to affected area BID X 1 month supply (Patient not taking: Reported on 3/25/2024)    clotrimazole (LOTRIMIN) 1 % solution Apply 2 drops to each ear canal BID X 1 month supply (Patient not taking: Reported on 3/25/2024)    hydrocortisone 2.5 % ointment Apply topically 2 times daily (Patient not taking: Reported on 3/25/2024)    mupirocin (BACTROBAN) 2 % ointment Apply a small amount to both ear 2 times a day for 10 days (Patient not taking: Reported on  3/28/2017)    Omega-3 Fatty Acids (OMEGA-3 FISH OIL PO)  (Patient not taking: Reported on 3/25/2024)    Sertraline HCl (ZOLOFT PO) Take 40 mg by mouth daily (Patient not taking: Reported on 3/25/2024)    sulfamethoxazole-trimethoprim (BACTRIM DS/SEPTRA DS) 800-160 MG per tablet Take 1 tab PO BID x 10 days (Patient not taking: Reported on 3/28/2017)    tobramycin-dexamethasone (TOBRADEX) 0.3-0.1 % ophthalmic susp Two drops to each ear BID X 14 days supply (Patient not taking: Reported on 3/28/2017)    triamcinolone (KENALOG) 0.025 % cream Apply topically At Bedtime Apply to affected ear after completion of bactroban treatment. (Patient not taking: Reported on 3/28/2017)    triamcinolone (KENALOG) 0.025 % cream Please use thin layer bilateral ear canals opening at night to begin after 3 weeks of Triamcinolone at strength of 0.1% (Patient not taking: Reported on 3/28/2017)    triamcinolone (KENALOG) 0.1 % cream Use thin layer to bilatereal external auditory canal opening at night x 3 weeks, then stop, use 0.025 % strength as maintenance (Patient not taking: Reported on 3/28/2017)     No current facility-administered medications for this visit.              Review of Systems:   A comprehensive 10 point review of systems (constitutional, ENT, cardiac, peripheral vascular, lymphatic, respiratory, GI, , Musculoskeletal, skin, Neurological) was performed and found to be negative except as described in this note.       HOOS Hip Dysfunction & Osteoarthritis Outcome Questionnaire         No data to display                       [unfilled]    KOOS Knee Survey Assessment         No data to display                       Promis 10 Assessment        3/25/2024     7:22 AM   PROMIS 10   In general, would you say your health is: Good   In general, would you say your quality of life is: Very good   In general, how would you rate your physical health? Fair   In general, how would you rate your mental health, including your mood and  your ability to think? Good   In general, how would you rate your satisfaction with your social activities and relationships? Very good   In general, please rate how well you carry out your usual social activities and roles Good   To what extent are you able to carry out your everyday physical activities such as walking, climbing stairs, carrying groceries, or moving a chair? Mostly   In the past 7 days, how often have you been bothered by emotional problems such as feeling anxious, depressed, or irritable? Sometimes   In the past 7 days, how would you rate your fatigue on average? Moderate   In the past 7 days, how would you rate your pain on average, where 0 means no pain, and 10 means worst imaginable pain? 5   In general, would you say your health is: 3   In general, would you say your quality of life is: 4   In general, how would you rate your physical health? 2   In general, how would you rate your mental health, including your mood and your ability to think? 3   In general, how would you rate your satisfaction with your social activities and relationships? 4   In general, please rate how well you carry out your usual social activities and roles. (This includes activities at home, at work and in your community, and responsibilities as a parent, child, spouse, employee, friend, etc.) 3   To what extent are you able to carry out your everyday physical activities such as walking, climbing stairs, carrying groceries, or moving a chair? 4   In the past 7 days, how often have you been bothered by emotional problems such as feeling anxious, depressed, or irritable? 3   In the past 7 days, how would you rate your fatigue on average? 3   In the past 7 days, how would you rate your pain on average, where 0 means no pain, and 10 means worst imaginable pain? 5   Global Mental Health Score 14   Global Physical Health Score 12   PROMIS TOTAL - SUBSCORES 26              Ortho [place] Knee Questionnaire         No data to  display                         See intake form completed by patient

## 2024-03-25 ENCOUNTER — PREP FOR PROCEDURE (OUTPATIENT)
Dept: ORTHOPEDICS | Facility: CLINIC | Age: 59
End: 2024-03-25

## 2024-03-25 ENCOUNTER — ANCILLARY PROCEDURE (OUTPATIENT)
Dept: GENERAL RADIOLOGY | Facility: CLINIC | Age: 59
End: 2024-03-25
Attending: ORTHOPAEDIC SURGERY
Payer: COMMERCIAL

## 2024-03-25 ENCOUNTER — OFFICE VISIT (OUTPATIENT)
Dept: ORTHOPEDICS | Facility: CLINIC | Age: 59
End: 2024-03-25
Payer: COMMERCIAL

## 2024-03-25 VITALS — BODY MASS INDEX: 36.48 KG/M2 | WEIGHT: 227 LBS | HEIGHT: 66 IN

## 2024-03-25 DIAGNOSIS — M25.552 LEFT HIP PAIN: ICD-10-CM

## 2024-03-25 DIAGNOSIS — M16.12 PRIMARY OSTEOARTHRITIS OF LEFT HIP: Primary | ICD-10-CM

## 2024-03-25 PROCEDURE — 73502 X-RAY EXAM HIP UNI 2-3 VIEWS: CPT | Mod: GC | Performed by: RADIOLOGY

## 2024-03-25 PROCEDURE — 99204 OFFICE O/P NEW MOD 45 MIN: CPT | Performed by: ORTHOPAEDIC SURGERY

## 2024-03-25 RX ORDER — PROGESTERONE 100 MG/1
100 CAPSULE ORAL DAILY
COMMUNITY

## 2024-03-25 RX ORDER — CEFAZOLIN SODIUM 2 G/50ML
2 SOLUTION INTRAVENOUS SEE ADMIN INSTRUCTIONS
Status: CANCELLED | OUTPATIENT
Start: 2024-03-25

## 2024-03-25 RX ORDER — CEFAZOLIN SODIUM 2 G/50ML
2 SOLUTION INTRAVENOUS
Status: CANCELLED | OUTPATIENT
Start: 2024-03-25

## 2024-03-25 RX ORDER — TRANEXAMIC ACID 650 MG/1
1950 TABLET ORAL ONCE
Status: CANCELLED | OUTPATIENT
Start: 2024-03-25 | End: 2024-03-25

## 2024-03-25 RX ORDER — CELECOXIB 100 MG/1
400 CAPSULE ORAL ONCE
Status: CANCELLED | OUTPATIENT
Start: 2024-03-25 | End: 2024-03-25

## 2024-03-25 RX ORDER — ACETAMINOPHEN 325 MG/1
975 TABLET ORAL ONCE
Status: CANCELLED | OUTPATIENT
Start: 2024-03-25 | End: 2024-03-25

## 2024-03-25 ASSESSMENT — HOOS JR
SITTING: MILD
GOING UP OR DOWN STAIRS: MODERATE
HOOS JR TOTAL INTERVAL SCORE: 58.93
LYING IN BED (TURNING OVER, MAINTAINING HIP POSITION): MILD
RISING FROM SITTING: MODERATE
WALKING ON UNEVEN SURFACE: MODERATE
BENDING TO THE FLOOR TO PICK UP OBJECT: MODERATE

## 2024-03-25 NOTE — NURSING NOTE
Pre-Op Teaching was done in person at the clinic.    Teaching Flowsheet   Relevant Diagnosis: Pre-Op Teaching  Teaching Topic: L AVIS Pre-Op     Person(s) involved in teaching:   Patient     Motivation Level:  Asks Questions: Yes  Eager to Learn: Yes  Cooperative: Yes  Receptive (willing/able to accept information): Yes  Any cultural factors/Zoroastrianism beliefs that may influence understanding or compliance? No     Patient demonstrates understanding of the following:  Reason for the appointment, diagnosis and treatment plan: Yes  Knowledge of proper use of medications and conditions for which they are ordered (with special attention to potential side effects or drug interactions): Yes  Which situations necessitate calling provider and whom to contact: Yes- discussed the stoplight tool to help assist with this.      Teaching Concerns Addressed:      Proper use of surgical scrub explain: Yes    Nutritional needs and diet plan: Yes  Pain management techniques: Yes  Wound Care: Yes  How and/when to access community resources: Yes  Need for pre-op with in 30 days: Yes- asked that pre-op be done between 25-20 days before surgery is scheduled.   -I asked them to ensure they go over their daily medications during this visit and discuss what medications need to be stopped before surgery and when. If you are doing a pre-op with your PCP and they are not within the [facility], I ask them to fax it to our pre-op office. Patient verbalized understanding.      Instructional Materials Used/Given:  2 bottle of chlorhexidine, a surgery packet, and a joint booklet given to patient in clinic.      - Important contact info/ phone numbers: emphasizing clinic number and after hours number  - Map/ location of surgery  - Showering instructions  - Stop light tool  - Your Guide to Joint Replacement Booklet   - Antibiotic post op before every dentist appointment or procedure for the rest of their life.     Additionally the following was  discussed with patient:  -  or son, will be driving the patient to surgery and able to pick the patient up after discharge and staying with them for 4-5 days after surgery.  - Need to schedule a dentist appointment and get done any recommended dental work prior to surgery.   - Online joint replacement call and the use of [employer] to send educational messages. Asked patient to sign up for join class during visit and patient declined to sign up at this time and stated will sign up later. Sheet with sign up instruction given to patient.   - Told patient to check in with insurance to check about coverage.     -Next step: Schedule a surgery date and schedule a Pre-Op appointment with PAC    Time spent with patient: 15 minutes.

## 2024-03-25 NOTE — LETTER
3/25/2024         RE: Veronica Turk  [address] [facility] [address]        Dear Colleague,    Thank you for referring your patient, Veronica Turk, to the [facility] [place]. Please see a copy of my visit note below.        [facility]  Orthopaedic Surgery, Joint Replacement Consultation  by Phil Hopkins M.D.    Veronica Turk MRN# [id number]    YOB: 1965     Requesting physician: Chani Forte PA-C, pain and palliation service, [facility]  Rosailnd Engel,, CNP, [facility] orthopedics  Randy Alvarado MD, sports medicine, [facility]              Assessment and Plan:   Assessment:  Severe degenerative disease of left hip joint with complete loss of cartilage thickness superiorly and progression over the past 12 months.  Symptoms are intractable despite previous steroid injections.     Plan:  In order to obtain complete pain relief, this could only be achieved through proceeding with left total hip arthroplasty.  She has exhausted nonsurgical measures.  I do not advise any interventions for her lumbar spine at this time.    We discussed the risk benefits alternatives to total hip arthroplasty surgery, the expected outcomes and potential complications.  We discussed the different techniques for performing surgery.  In my opinion she is best served by undergoing a posterior approach total hip arthroplasty as a posterior a direct anterior approach.      We also discussed measures that she can take to improve her pre and postoperative care.   Preoperative dental evaluation, preoperative joint replacement class, obtaining assistive devices and preparing her home for temporary disability would be in her best interest along with preanesthetic clinic consultation.    She has had a prior ACL reconstruction of her right knee joint as well as arthroscopic procedures on both  "knees.      MD Shanna Haq Family Professor  Oncology and Adult Reconstructive Surgery  Dept Orthopaedic Surgery, [facility]  [phone] office, [phone] pager  [email or web address]       For additional information and frequently asked questions regarding joint replacements, scan the QR code image below on your phone camera or go to:  [email or web address]              History of Present Illness:   58 year old female  chief complaint    This patient is having marked discomfort involving her left groin and thigh area with some radicular symptoms down to her foot.  She has a known lumbar radiculopathy and has been working with the pain and palliation and sports medicine services at [facility] to address her symptoms.  Differential steroid injections into the lumbar spine and hip area were administered.  None were of any substantial benefit to her.  She is also undergone physical therapy and other nonsurgical measures such as chiropractic care and different types of \"wave or light therapy\".    Current symptoms:  Problem: Left hip OA   Onset and duration: Has been a few years but had a recent flare up a year ago   Awakens from sleep due to sx's:  No  Precipitating Injury:  No    Other joints or sites painful:  Yes  Prior treatments:  Non steroidal anti-inflammatory agents or aspirin: Yes  Reduced activity:  No  Physical therapy:  No  Chiropractic care:  Yes  Injections with either steroid or viscosupplementation agents:  Yes           Physical Exam:     EXAMINATION pertinent findings:   PSYCH: Pleasant, healthy-appearing, alert, oriented x3, cooperative. Normal mood and affect.  VITAL SIGNS: Height 1.675 m (5' 5.95\"), weight 103 kg (227 lb)..  Reviewed nursing intake notes.   Body mass index is 36.7 kg/m .  RESP: non labored breathing   ABD: benign, soft, non-tender, no acute peritoneal findings  SKIN: grossly normal   LYMPHATIC: grossly normal, no " adenopathy, no extremity edema  NEURO: grossly normal , no motor deficits  VASCULAR: satisfactory perfusion of all extremities   MUSCULOSKELETAL:   Minimally antalgic gait.  Severely restricted range of motion left hip joint with 0 to 90 degrees flexion, 0 degrees internal rotation, 20 degrees external rotation, abduction 30 adduction 20.  Right hip joint has full range of motion with 0 to 120 degrees flexion, 20 degrees internal rotation, 60 degrees external rotation, abduction 60 adduction 20.    Both knees have full range of motion.           Data:   All laboratory data reviewed  All imaging studies reviewed by me    Imaging tests:  XR, MRI,     Left hip shows evidence of complete loss of cartilage thickness superiorly with subchondral sclerosis present.  This has progressed since 2023 a year ago.  Some osteophyte formation noted as well.  Kellgren-Ed grading:    grade 4 (severe): large osteophytes, marked narrowing of joint space, severe sclerosis and definite deformity of bone ends      DATA for DOCUMENTATION:         Past Medical History:     Patient Active Problem List   Diagnosis    Ear pain     Past Medical History:   Diagnosis Date    Eczema     Hearing problem     Nasal congestion     Snoring     Sore throat     Tinnitus        Also see scanned health assessment forms.       Past Surgical History:     Past Surgical History:   Procedure Laterality Date    ANTERIOR CRUCIATE LIGAMENT REPAIR Right     ARTHROSCOPY KNEE Bilateral     BREAST SURGERY      reduction    BREAST SURGERY      FISSURECTOMY RECTUM N/A 6/22/2018    Procedure: EXAM UNDER ANESTHESIA WITH EXCISION OF SKIN TAG;  Surgeon: Bessy Carrillo MD;  Location: [facility];  Service:     MAMMOPLASTY REDUCTION Bilateral     MAMMOPLASTY REDUCTION Bilateral     ORTHOPEDIC SURGERY      knee acl scope    OTHER SURGICAL HISTORY Right     Rt hand reconstruction    TYMPANOPLASTY  8/17/2011    Procedure:TYMPANOPLASTY; Posterior Auricular  with facial nerve monitoring *Latex Safe* tympanoplasty with canalplasty; Surgeon:DIANNA JOHNSTON; Location:[facility]            Social History:     Social History     Socioeconomic History    Marital status:      Spouse name: Not on file    Number of children: Not on file    Years of education: Not on file    Highest education level: Not on file   Occupational History    Not on file   Tobacco Use    Smoking status: Some Days     Years: 10     Types: Cigarettes    Smokeless tobacco: Never   Substance and Sexual Activity    Alcohol use: Yes     Alcohol/week: 3.0 standard drinks of alcohol     Comment: Alcoholic Drinks/day: 3 days a week    Drug use: No    Sexual activity: Yes     Partners: Male     Birth control/protection: Male Surgical   Other Topics Concern    Parent/sibling w/ CABG, MI or angioplasty before 65F 55M? Not Asked   Social History Narrative    Not on file     Social Determinants of Health     Financial Resource Strain: Not on file   Food Insecurity: Not on file   Transportation Needs: Not on file   Physical Activity: Not on file   Stress: Not on file   Social Connections: Not on file   Interpersonal Safety: Not on file   Housing Stability: Not on file            Family History:       Family History   Problem Relation Age of Onset    Depression Brother     Asthma Son     Asthma Son     Stomach Problem Son     Asthma Niece     Asthma Nephew     Cancer Father     Stomach Problem Father     Depression Other     Skin Cancer No family hx of     Melanoma No family hx of             Medications:     Current Outpatient Medications   Medication Sig    Multiple Vitamins-Minerals (MULTIVITAL PO)     NALTREXONE HCL PO     progesterone (PROMETRIUM) 100 MG capsule Take 100 mg by mouth daily    TURMERIC PO     clotrimazole (LOTRIMIN) 1 % cream Apply to affected area BID X 1 month supply (Patient not taking: Reported on 3/25/2024)    clotrimazole (LOTRIMIN) 1 % solution Apply 2 drops to each ear canal BID X 1  month supply (Patient not taking: Reported on 3/25/2024)    hydrocortisone 2.5 % ointment Apply topically 2 times daily (Patient not taking: Reported on 3/25/2024)    mupirocin (BACTROBAN) 2 % ointment Apply a small amount to both ear 2 times a day for 10 days (Patient not taking: Reported on 3/28/2017)    Omega-3 Fatty Acids (OMEGA-3 FISH OIL PO)  (Patient not taking: Reported on 3/25/2024)    Sertraline HCl (ZOLOFT PO) Take 40 mg by mouth daily (Patient not taking: Reported on 3/25/2024)    sulfamethoxazole-trimethoprim (BACTRIM DS/SEPTRA DS) 800-160 MG per tablet Take 1 tab PO BID x 10 days (Patient not taking: Reported on 3/28/2017)    tobramycin-dexamethasone (TOBRADEX) 0.3-0.1 % ophthalmic susp Two drops to each ear BID X 14 days supply (Patient not taking: Reported on 3/28/2017)    triamcinolone (KENALOG) 0.025 % cream Apply topically At Bedtime Apply to affected ear after completion of bactroban treatment. (Patient not taking: Reported on 3/28/2017)    triamcinolone (KENALOG) 0.025 % cream Please use thin layer bilateral ear canals opening at night to begin after 3 weeks of Triamcinolone at strength of 0.1% (Patient not taking: Reported on 3/28/2017)    triamcinolone (KENALOG) 0.1 % cream Use thin layer to bilatereal external auditory canal opening at night x 3 weeks, then stop, use 0.025 % strength as maintenance (Patient not taking: Reported on 3/28/2017)     No current facility-administered medications for this visit.              Review of Systems:   A comprehensive 10 point review of systems (constitutional, ENT, cardiac, peripheral vascular, lymphatic, respiratory, GI, , Musculoskeletal, skin, Neurological) was performed and found to be negative except as described in this note.       HOOS Hip Dysfunction & Osteoarthritis Outcome Questionnaire         No data to display                       [unfilled]    KOOS Knee Survey Assessment         No data to display                       Promis 10  Assessment        3/25/2024     7:22 AM   PROMIS 10   In general, would you say your health is: Good   In general, would you say your quality of life is: Very good   In general, how would you rate your physical health? Fair   In general, how would you rate your mental health, including your mood and your ability to think? Good   In general, how would you rate your satisfaction with your social activities and relationships? Very good   In general, please rate how well you carry out your usual social activities and roles Good   To what extent are you able to carry out your everyday physical activities such as walking, climbing stairs, carrying groceries, or moving a chair? Mostly   In the past 7 days, how often have you been bothered by emotional problems such as feeling anxious, depressed, or irritable? Sometimes   In the past 7 days, how would you rate your fatigue on average? Moderate   In the past 7 days, how would you rate your pain on average, where 0 means no pain, and 10 means worst imaginable pain? 5   In general, would you say your health is: 3   In general, would you say your quality of life is: 4   In general, how would you rate your physical health? 2   In general, how would you rate your mental health, including your mood and your ability to think? 3   In general, how would you rate your satisfaction with your social activities and relationships? 4   In general, please rate how well you carry out your usual social activities and roles. (This includes activities at home, at work and in your community, and responsibilities as a parent, child, spouse, employee, friend, etc.) 3   To what extent are you able to carry out your everyday physical activities such as walking, climbing stairs, carrying groceries, or moving a chair? 4   In the past 7 days, how often have you been bothered by emotional problems such as feeling anxious, depressed, or irritable? 3   In the past 7 days, how would you rate your fatigue  on average? 3   In the past 7 days, how would you rate your pain on average, where 0 means no pain, and 10 means worst imaginable pain? 5   Global Mental Health Score 14   Global Physical Health Score 12   PROMIS TOTAL - SUBSCORES 26              Ortho [place] Knee Questionnaire         No data to display                         See intake form completed by patient

## 2024-03-29 ENCOUNTER — TELEPHONE (OUTPATIENT)
Dept: ORTHOPEDICS | Facility: CLINIC | Age: 59
End: 2024-03-29
Payer: COMMERCIAL

## 2024-03-29 NOTE — TELEPHONE ENCOUNTER
- A call was placed to the patient.     - I let her know Kirstie is out today and she will give her a call next week. The dentist is booked out until October, but she is on the wait list.     - Looking for late June or early July for a surgical date.     - Patient verbalized understanding of plan and all questions were answered. Call back number to clinic was given and patient was told to call if they had an further questions.

## 2024-03-29 NOTE — TELEPHONE ENCOUNTER
[facility] Call Center    Phone Message    May a detailed message be left on voicemail: yes     Reason for Call: Other: Patient is calling to schedule surgery for LTH       Action Taken: Message routed to:  [facility] Sandeep     Travel Screening: Not Applicable

## 2024-04-01 ENCOUNTER — TELEPHONE (OUTPATIENT)
Dept: ORTHOPEDICS | Facility: CLINIC | Age: 59
End: 2024-04-01
Payer: COMMERCIAL

## 2024-04-01 DIAGNOSIS — Z96.642 STATUS POST TOTAL REPLACEMENT OF LEFT HIP: Primary | ICD-10-CM

## 2024-04-01 NOTE — TELEPHONE ENCOUNTER
Patient is scheduled for surgery with Dr. Hopkins    Spoke with: patient    Date of Surgery: 6/21/24    Location: [place]    Post op: 7/22/24    Pre op with Provider complete    H&P: Scheduled with PAC 5/28/24    Additional imaging/appointments: N/A    Surgery packet: received     Additional comments: N/A        Kirstie Rendon CMA on 4/1/2024 at 3:31 PM

## 2024-04-02 NOTE — TELEPHONE ENCOUNTER
FUTURE VISIT INFORMATION      SURGERY INFORMATION:  Date: 6/21/24  [facility]  Surgeon:  Phil Hopkins MD   Anesthesia Type:  choice  Procedure: ARTHROPLASTY, HIP, TOTAL   Consult: ov 3/25/24    RECORDS REQUESTED FROM:       Primary Care Provider: Sarah Owens MD  - [facility]    Most recent EKG+ Tracing: 3/2/23- [facility]

## 2024-04-27 ENCOUNTER — HEALTH MAINTENANCE LETTER (OUTPATIENT)
Age: 59
End: 2024-04-27

## 2024-05-13 ENCOUNTER — TELEPHONE (OUTPATIENT)
Dept: ORTHOPEDICS | Facility: CLINIC | Age: 59
End: 2024-05-13
Payer: COMMERCIAL

## 2024-05-13 NOTE — TELEPHONE ENCOUNTER
Patient is scheduled for surgery with Dr. Hopkins    Spoke with: patient    Date of Surgery: 8/6/24    Location: [place]    Post op: 9/4/24    Pre op with Provider complete    H&P: Scheduled with PAC 7/16/24    Additional imaging/appointments: N/A    Surgery packet: received     Additional comments: N/A        Kirstie Rendon CMA on 5/13/2024 at 12:38 PM

## 2024-05-13 NOTE — TELEPHONE ENCOUNTER
Other: Pt is requesting a call back to reschedule surgery to July or August      Could we send this information to you in [employer] or would you prefer to receive a phone call?:   Patient would prefer a phone call   Okay to leave a detailed message?: Yes at Cell number on file:    Telephone Information:   Mobile [phone]

## 2024-05-15 NOTE — TELEPHONE ENCOUNTER
FUTURE VISIT INFORMATION        SURGERY INFORMATION:  Date: 8/6/24  [facility]  Surgeon:  Phil Hopkins MD   Anesthesia Type:  choice  Procedure: ARTHROPLASTY, HIP, TOTAL   Consult: ov 3/25/24     RECORDS REQUESTED FROM:         Primary Care Provider: Sarah Owens MD  - [facility]     Most recent EKG+ Tracing: 3/2/23- [facility]

## 2024-05-28 ENCOUNTER — PRE VISIT (OUTPATIENT)
Dept: SURGERY | Facility: CLINIC | Age: 59
End: 2024-05-28

## 2024-07-16 ENCOUNTER — ANESTHESIA EVENT (OUTPATIENT)
Dept: SURGERY | Facility: CLINIC | Age: 59
End: 2024-07-16
Payer: COMMERCIAL

## 2024-07-16 ENCOUNTER — TELEPHONE (OUTPATIENT)
Dept: ORTHOPEDICS | Facility: CLINIC | Age: 59
End: 2024-07-16

## 2024-07-16 ENCOUNTER — VIRTUAL VISIT (OUTPATIENT)
Dept: SURGERY | Facility: CLINIC | Age: 59
End: 2024-07-16
Payer: COMMERCIAL

## 2024-07-16 ENCOUNTER — PRE VISIT (OUTPATIENT)
Dept: SURGERY | Facility: CLINIC | Age: 59
End: 2024-07-16

## 2024-07-16 VITALS — BODY MASS INDEX: 35.36 KG/M2 | WEIGHT: 220 LBS | HEIGHT: 66 IN

## 2024-07-16 DIAGNOSIS — Z01.818 PRE-OPERATIVE EXAMINATION: Primary | ICD-10-CM

## 2024-07-16 DIAGNOSIS — R73.03 PREDIABETES: ICD-10-CM

## 2024-07-16 DIAGNOSIS — M16.12 PRIMARY OSTEOARTHRITIS OF LEFT HIP: ICD-10-CM

## 2024-07-16 PROCEDURE — 99204 OFFICE O/P NEW MOD 45 MIN: CPT | Mod: 95 | Performed by: PHYSICIAN ASSISTANT

## 2024-07-16 RX ORDER — CALCIUM CARBONATE 500 MG/1
1 TABLET, CHEWABLE ORAL PRN
COMMUNITY

## 2024-07-16 RX ORDER — IBUPROFEN 200 MG
800 TABLET ORAL EVERY 8 HOURS PRN
Status: ON HOLD | COMMUNITY
End: 2024-08-14

## 2024-07-16 ASSESSMENT — LIFESTYLE VARIABLES: TOBACCO_USE: 1

## 2024-07-16 ASSESSMENT — ENCOUNTER SYMPTOMS
SEIZURES: 0
DYSRHYTHMIAS: 1

## 2024-07-16 ASSESSMENT — PAIN SCALES - GENERAL: PAINLEVEL: MILD PAIN (2)

## 2024-07-16 NOTE — H&P
Pre-Operative H & P     ADDENDUM:  The patient completed pre op labs. No further testing indicated.       Hgb A1C (Expected: 90 days)  Specimen: Blood  Component  Ref Range & Units 7 d ago Comments   Hemoglobin A1C  <=5.6 % 5.5    Estimated Average Glucose (Calc)  < 117 mg/dL 111 Estimated average glucose (eAG) converts A1c into glucose units (mg/dL) and estimates average glucose over the past approximately 3 months.  The eAG reference interval (<117 mg/dL) corresponds to an A1c of <5.7%.   Resulting [facility]    Specimen Collected: 07/22/24  8:06 AM    Performed by: [facility] Last Resulted: 07/22/24 11:48 AM   omplete Blood Count-No Diff  Specimen: Blood  Component  Ref Range & Units 7 d ago   WBC  3.5 - 10.5 x10(9)/L 4.8   RBC  3.90 - 5.03 x10(12)/L 4.44   Hemoglobin  12.0 - 15.5 g/dL 13.6   HCT  34.9 - 44.5 % 39.2   MCV  80.0 - 100.0 fL 88.3   MCH  27.6 - 33.3 pg 30.6   MCHC  31.5 - 35.2 g/dL 34.7   RDW  11.9 - 15.5 % 11.7 Low    Platelets  150 - 450 x10(9)/L 202   Resulting [facility]   Specimen Collected: 07/22/24  8:06 AM    Performed by: [facility] Last Resulted: 07/22/24  8:12 AM   Basic Metabolic Panel  Specimen: Blood  Component  Ref Range & Units 7 d ago Comments   Sodium  136 - 145 mmol/L 140    Potassium  3.5 - 5.1 mmol/L 4.0    Chloride  98 - 109 mmol/L 107    CO2  20 - 29 mmol/L 21    Anion Gap  6 - 16 mmol/L 12    Calcium  8.4 - 10.4 mg/dL 9.6    BUN  7 - 26 mg/dL 18    Creatinine  0.55 - 1.02 mg/dL 0.78    Glucose  70 - 100 mg/dL 102 High  The given reference range is for the fasting state. Non-fasting reference range for glucose is 70 - 180 mg/dL.   GFR, Estimated  >60 mL/min/1.73m2 >60    Hours Fasting  8 - 12 Hours 0.1    Resulting [facility]    Specimen Collected: 07/22/24  8:06 AM    Performed by: [facility] Last Resulted: 07/22/24 12:04 PM       CC:  Preoperative exam to assess for increased cardiopulmonary  risk while undergoing surgery and anesthesia.    Date of Encounter: 7/16/2024  Primary Care Physician:  Sarah Owens     Reason for visit:   Encounter Diagnoses   Name Primary?    Pre-operative examination Yes    Primary osteoarthritis of left hip     Prediabetes        HPI  Veronica Herrera is a 58 year old female who presents for pre-operative H & P in preparation for  Procedure Information       Case: [id number] Date/Time: 08/06/24 0830    Procedure: ARTHROPLASTY, HIP, TOTAL (Left: Hip)    Anesthesia type: Choice    Diagnosis: Primary osteoarthritis of left hip [M16.12]    Pre-op diagnosis: Primary osteoarthritis of left hip [M16.12]    Location:  OR 12 /  OR    Providers: Phil Hopkins MD            The patient is a 58 year old woman who has a past medical history significant for PVCs, tobacco use, obesity, prediabetes, GERD, MRSA, depression and lumbar back pain. She was seen by Dr. Hopkins on 3/25/24 for symptoms of ongoing hip pain. They discussed treatment options and the patient is now scheduled for the procedure as above.     History is obtained from the patient and chart review    Hx of abnormal bleeding or anti-platelet use: none    Menstrual history: No LMP recorded. Patient is postmenopausal.:      Past Medical History  Past Medical History:   Diagnosis Date    Depression     Eczema     Hearing problem     Nasal congestion     Obesity     Prediabetes     Primary osteoarthritis of left hip     PVC's (premature ventricular contractions)     Snoring     Sore throat     Tinnitus     Tobacco use        Past Surgical History  Past Surgical History:   Procedure Laterality Date    ANTERIOR CRUCIATE LIGAMENT REPAIR Right     ARTHROSCOPY KNEE Bilateral     COSMETIC ABDOMINOPLASTY  2019    FISSURECTOMY RECTUM N/A 06/22/2018    Procedure: EXAM UNDER ANESTHESIA WITH EXCISION OF SKIN TAG;  Surgeon: Bessy Carrillo MD;  Location: [facility];  Service:     LIPOSUCTION TRUNK      MAMMOPLASTY  REDUCTION Bilateral     OTHER SURGICAL HISTORY Right     Rt hand reconstruction    TYMPANOPLASTY  08/17/2011    Procedure:TYMPANOPLASTY; Posterior Auricular with facial nerve monitoring *Latex Safe* tympanoplasty with canalplasty; Surgeon:DIANNA JOHNSTON; Location:[facility]       Prior to Admission Medications  Current Outpatient Medications   Medication Sig Dispense Refill    calcium carbonate (TUMS) 500 MG chewable tablet Take 1 chew tab by mouth as needed for heartburn      ibuprofen (ADVIL/MOTRIN) 200 MG tablet Take 800 mg by mouth every 8 hours as needed for pain      Multiple Vitamins-Minerals (MULTIVITAL PO) Take 1 tablet by mouth every morning      NALTREXONE HCL PO  (Patient not taking: Reported on 7/16/2024)      Omega-3 Fatty Acids (OMEGA-3 FISH OIL PO)  (Patient not taking: Reported on 3/25/2024)      progesterone (PROMETRIUM) 100 MG capsule Take 100 mg by mouth daily      TURMERIC PO  (Patient not taking: Reported on 7/16/2024)         Allergies  Allergies   Allergen Reactions    Ciprofloxacin Hives    Ciprofloxacin Rash and Hives       Social History  Social History     Socioeconomic History    Marital status:      Spouse name: Not on file    Number of children: Not on file    Years of education: Not on file    Highest education level: Not on file   Occupational History    Not on file   Tobacco Use    Smoking status: Some Days     Types: Cigarettes    Smokeless tobacco: Never    Tobacco comments:     1-5 cigarettes weekly social   Substance and Sexual Activity    Alcohol use: Yes     Alcohol/week: 3.0 standard drinks of alcohol     Comment: Alcoholic Drinks/day: 3 days a week    Drug use: No    Sexual activity: Yes     Partners: Male     Birth control/protection: Male Surgical   Other Topics Concern    Parent/sibling w/ CABG, MI or angioplasty before 65F 55M? Not Asked   Social History Narrative    Not on file     Social Determinants of Health     Financial Resource Strain: Not on file   Food  Insecurity: Not on file   Transportation Needs: Not on file   Physical Activity: Not on file   Stress: Not on file   Social Connections: Not on file   Interpersonal Safety: Not on file   Housing Stability: Not on file       Family History  Family History   Problem Relation Age of Onset    Cancer Father     Stomach Problem Father     Depression Brother     Asthma Son     Asthma Son     Stomach Problem Son     Asthma Niece     Asthma Nephew     Depression Other     Skin Cancer No family hx of     Melanoma No family hx of     Anesthesia Reaction No family hx of     Deep Vein Thrombosis (DVT) No family hx of        Review of Systems  The complete review of systems is negative other than noted in the HPI or here.   Anesthesia Evaluation   Pt has had prior anesthetic. Type: General and MAC.        ROS/MED HX  ENT/Pulmonary: Comment: S/p tympanoplasty  - neg pulmonary ROS   (+)                tobacco use, Current use,                       Neurologic:  - neg neurologic ROS  (-) no seizures, no CVA and no TIA   Cardiovascular:     (+)  - -   -  - -                        dysrhythmias (PVCs), Other,        Previous cardiac testing   Echo: Date: Results:    Stress Test:  Date: 2016 Results:   CONCLUSION:    Patient exercised minutes seconds on a Mina protocol.     Good functional aerobic capacity.     Began with chest tightness 2/10, no change with exercise or     recovery.    Negative ECG test for diagnostic ST depression.     Normal Stress Echo test, no evidence for ischemia.       ECG Reviewed:  Date: 3/2/23 Results:  Sinus rhythm with sinus arrhythmia   Normal ECG     Cath:  Date: Results:      METS/Exercise Tolerance: 4 - Raking leaves, gardening    Hematologic:  - neg hematologic  ROS     Musculoskeletal: Comment: Lumbar back pain     (+)  arthritis,             GI/Hepatic:     (+) GERD, Asymptomatic on medication,                  Renal/Genitourinary:  - neg Renal ROS     Endo: Comment: prediabetes    (+)                Obesity,       Psychiatric/Substance Use:     (+) psychiatric history depression       Infectious Disease:     (+)   MRSA,         Malignancy:  - neg malignancy ROS     Other:  - neg other ROS          Virtual visit -  No vitals were obtained    Physical Exam  Constitutional: Awake, alert, cooperative, no apparent distress, and appears stated age.  Eyes: Pupils equal, glasses  HENT: Normocephalic  Respiratory: non labored breathing   Neurologic: Awake, alert, oriented to name, place and time.   Neuropsychiatric: Calm, cooperative. Normal affect.      Prior Labs/Diagnostic Studies   All labs and imaging personally reviewed     EKG/ stress test - if available please see in ROS above       The patient's records and results personally reviewed by this provider.     Outside records reviewed from: Care Everywhere      Assessment    Veronica Herrera is a 58 year old female seen as a PAC referral for risk assessment and optimization for anesthesia.    Plan/Recommendations  Pt will be optimized for the proposed procedure.  See below for details on the assessment, risk, and preoperative recommendations    NEUROLOGY  - No history of TIA, CVA or seizure  -Post Op delirium risk factors:  No risk identified    ENT  - No current airway concerns.  Will need to be reassessed day of surgery.  Mallampati: Unable to assess  TM: Unable to assess    CARDIAC  - No history of CAD, Hypertension, and Afib  - PVCs  - METS (Metabolic Equivalents)  Patient performs 4 or more METS exercise without symptoms             Total Score: 0      RCRI-Very low risk: Class 1 0.4% complication rate             Total Score: 0    ~ The patient is walking her her pool to stay active. She otherwise is limited in activity due to her hip pain. She denies any cardiac symptoms.     PULMONARY  - Obstructive Sleep Apnea  No current risk of obstructive sleep apnea   MICHAEL Low Risk             Total Score: 2    MICHAEL: BMI over 35 kg/m2    MICHAEL: Over 50 ys old      -  "Denies asthma or inhaler use  - Tobacco History    History   Smoking Status    Some Days    Types: Cigarettes   Smokeless Tobacco    Never   The patient was counseled to not smoke on the day of surgery      GI  - GERD  Taking PRN TUMs. She feels her heartburn is related to the ibuprofen she's been taking.   PONV Medium Risk  Total Score: 2           1 AN PONV: Pt is Female    1 AN PONV: Intended Post Op Opioids        /RENAL  - Baseline Creatinine  0.76  ~ The patient reports she was on medication including naltrexone in the past for menopause. Not currently taking.     ENDOCRINE    - BMI: Estimated body mass index is 35.51 kg/m  as calculated from the following:    Height as of this encounter: 1.676 m (5' 6\").    Weight as of this encounter: 99.8 kg (220 lb).  Obesity (BMI >30)  - prediabetes - A1c 5.7 on 3/2/23    HEME  VTE Low Risk 0.26%             Total Score: 0      - No history of abnormal bleeding or antiplatelet use.      MSK  Patient is NOT Frail             Total Score: 0      ~ Primary osteoarthritis of left hip - procedure as above. ORAS. Patient had questions about surgical dental clearance, post op recovery etc. Message sent to surgical team to reach out to the patient.   ~ Lumbar pain - followed at [facility] and has had previous injections.     PSYCH  - depression - Not currently on medications.     ID  ~ MRSA - contact precautions as indicated.     Different anesthesia methods/types have been discussed with the patient, but they are aware that the final plan will be decided by the assigned anesthesia provider on the date of service.    The patient is optimized for their procedure. AVS with information on surgery time/arrival time, meds and NPO status given by nursing staff. No further diagnostic testing indicated.    Please refer to the physical examination documented by the anesthesiologist in the anesthesia record on the day of surgery.    Video-Visit Details    Type of service:  Video " Visit    Provider received verbal consent for a Video Visit from the patient? Yes     Originating Location (pt. Location): Parked in their car    Distant Location (provider location):  Off-site  Mode of Communication:  Video Conference via [employer]  On the day of service:     Prep time: 6 minutes  Visit time: 18 minutes  Documentation time: 25 minutes  ------------------------------------------  Total time: 49 minutes      Kyra Arriola PA-C  Preoperative Assessment Center  [facility]  Clinic and Surgery Center  Phone: [phone]  Fax: [phone]

## 2024-07-16 NOTE — PROGRESS NOTES
Veronica is a 58 year old who is being evaluated via a billable video visit.    How would you like to obtain your AVS? MyChart  If the video visit is dropped, the invitation should be resent by: Text to cell phone: [phone]    Subjective   Veronica is a 58 year old, presenting for the following health issues:  Pre-Op Exam    HPI           Physical Exam

## 2024-07-16 NOTE — PATIENT INSTRUCTIONS
Preparing for Your Surgery      Name:  Veronica Herrera   MRN:  [id number]   :  1965   Today's Date:  2024       Arriving for surgery:  Surgery date:  24  Arrival time:  6:00 am  Surgery time: 8:30 am    Please come to:     Please come to:       [facility] [address]   [address] [place]  [address]     The Green Ramp for patients and visitors is beneath the [facility]. The parking facility entrance is at the intersection of [address] [place] [address] and [address]. Patients and visitors who self-park will receive the reduced hospital parking rate (no ticket validation needed).     [employer] parking, located at the [facility] main entrance on [address] [place] [address], is available Monday - Friday from 7 am to 3:30 pm.     Discounted parking pass options can be purchased from  attendants during business hours.     -Check in at the security desk in the [facility] ([facility])   Lobby. They will direct you to the correct elevators.   -Proceed to the 3rd floor, Adult Surgery Waiting Lounge. [phone]     If you need directions, a wheelchair or escort please stop at the Information Desk in the lobby.  Inform the information person you are here for surgery; a wheelchair and escort to [address] will be provided.   An escort to the Adult Surgery Waiting Lounge will be provided. .    What can I eat or drink?  -  You may eat and drink normally up to 8 hours prior to arrival time. (Until 10:00 pm on 24)  -  You may have clear liquids until 2 hours prior to arrival time. (Until 4:00 am on 24)    Examples of clear liquids:  Water  Clear broth  Juices (apple, white grape, white cranberry  and cider) without pulp  Noncarbonated, powder based beverages  (lemonade and George-Aid)  Sodas (Sprite, 7-Up, ginger ale and seltzer)  Coffee or tea (without milk or  cream)  Gatorade    -  No Alcohol or cannabis products for at least 24 hours before surgery.     Which medicines can I take?    Hold Aspirin for 7 days before surgery.   Hold Multivitamins for 7 days before surgery.  Hold Supplements for 7 days before surgery.  Hold Ibuprofen (Advil, Motrin) for 1 day(s) before surgery--unless otherwise directed by surgeon.  Hold Naproxen (Aleve) for 4 days before surgery.    -  DO NOT take these medications the day of surgery:  Tums    -  PLEASE TAKE these medications the day of surgery:  Not applicable      OPTIMAL RECOVERY AFTER SURGERY - start this the day before surgery       Begin hydrating yourself by drinking at least 8-10 glasses of clear liquids for 24 hours before surgery:      Suggested clear liquids:   Water    Clear Juices   Clear Broth   Non- carbonated beverages    (Crystal Light or George Aid)   Sodas    (Sprite, 7 up, ginger ale, seltzer)   Gatorade              Drink clear liquids up until 4 hours before your surgery.       We would like you to purchase a drink such as Gatorade or Ensure Clear (not the milkshake type).  Drink this before bedtime the night before surgery drink between 8-10 ounces or until you feel hydrated.        Keeping well hydrated leads to your veins being plump, you wake up faster, and you are less likely to be nauseated. Start drinking water as soon as you can after surgery and advance to clear liquids and food as tolerated.  IV fluids contain salt, drinking fluids will minimize the amount of IV fluids you need and decrease the amount of salt you get.                 The most common reason for the patient to be readmitted is dehydration. Staying hydrated after you go home from the hospital is very important.  Ensure or Ensure Clear are good options to keep you hydrated.        How do I prepare myself?  - Please take 2 showers (one the night prior to surgery and one the morning of surgery) using Scrubcare or Hibiclens soap.    Use this soap  only from the neck to your toes. Do not use in genital area.     Leave the soap on your skin for one minute--then rinse thoroughly.      You may use your own shampoo and conditioner. No other hair products.      Sleep in clean sheets and wear clean clothes.  - Please remove all jewelry and body piercings.  - No lotions, deodorants or fragrance.  - No makeup or fingernail polish.   - Bring your ID and insurance card.    -If you use a CPAP machine, please bring the CPAP machine, tubing, and mask to hospital.    -If you have a Deep Brain Stimulator, Spinal Cord Stimulator, or any Neuro Stimulator device---you must bring the remote control to the hospital.      ALL PATIENTS GOING HOME THE SAME DAY OF SURGERY ARE REQUIRED TO HAVE A RESPONSIBLE ADULT TO DRIVE AND BE IN ATTENDANCE WITH THEM FOR 24 HOURS FOLLOWING SURGERY.    Covid testing policy as of 12/06/2022  Your surgeon will notify and schedule you for a COVID test if one is needed before surgery--please direct any questions or COVID symptoms to your surgeon      Questions or Concerns:    - For any questions regarding the day of surgery or your hospital stay, please contact the Pre Admission Nursing Office at [phone].       - If you have health changes between today and your surgery, please call your surgeon.       - For questions after surgery, please call your surgeons office.           Current Visitor Guidelines    You may have 2 visitors in the pre op area.    Visiting hours: 8 a.m. to 8:30 p.m.    Patients confirmed or suspected to have symptoms of COVID 19 or flu:     No visitors allowed for adult patients.   Children (under age 18) can have 1 named visitor.     People who are sick or showing symptoms of COVID 19 or flu:    Are not allowed to visit patients--we can only make exceptions in special situations.       Please follow these guidelines for your visit:          Please maintain social distance          Masking is optional--however at times you may  be asked to wear a mask for the safety of yourself and others     Clean your hands with alcohol hand . Do this when you arrive at and leave the building and patient room,    And again after you touch your mask or anything in the room.     Go directly to and from the room you are visiting.     Stay in the patient s room during your visit. Limit going to other places in the hospital as much as possible     Leave bags and jackets at home or in the car.     For everyone s health, please don t come and go during your visit. That includes for smoking   during your visit.

## 2024-07-22 ENCOUNTER — TELEPHONE (OUTPATIENT)
Dept: ORTHOPEDICS | Facility: CLINIC | Age: 59
End: 2024-07-22

## 2024-07-22 NOTE — TELEPHONE ENCOUNTER
Patient is rescheduled for surgery with Dr. Hopkins (Dr. Hopkins out 8/6/24)    Spoke with: patient    Date of Surgery: 8/13/24    Location: [place]    Post op: 9/11/24    Pre op with Provider complete    H&P: PAC 7/16/24    Additional imaging/appointments: N/A    Surgery packet: received     Additional comments: N/A        Kirstie Rendon CMA on 7/22/2024 at 11:36 AM

## 2024-07-25 ENCOUNTER — TELEPHONE (OUTPATIENT)
Dept: ORTHOPEDICS | Facility: CLINIC | Age: 59
End: 2024-07-25
Payer: COMMERCIAL

## 2024-07-25 ENCOUNTER — PREP FOR PROCEDURE (OUTPATIENT)
Dept: OTHER | Facility: CLINIC | Age: 59
End: 2024-07-25
Payer: COMMERCIAL

## 2024-07-25 NOTE — PROGRESS NOTES
SURGERY PLAN (PRE-OP PLAN)     Patient Position (indicated by x):  X  Lateral decubitus, Wixson hip positioner   x  Regular OR table     Macario catheter   X  Revision AVIS drape with plastic side bags for leg   X  Blue U drape x2   Blue and white stockinet   Coban   Ioban      AVIS Requests (indicated by x):    Echo BiMetric stems   X  Biomet TAPERLOC ingrowth stem      Biomet Integral cemented stem     Biomet RB cup, 32 heads   X  G7 cup      Biomet Bipolar cup      Specimens and cultures: None       Plan: Primary Left AVIS with Severiano-[employer] on 8/13/24      --  Javon Nolan MD  Orthopedic Surgery PGY-4

## 2024-07-25 NOTE — TELEPHONE ENCOUNTER
Other: pt is asking questions about upcoming surgery with Dr. Hopkins .  She is 10 years overdue for colonoscopy.  Can she do this colonoscopy before having the surgery with Ralph Hopkins.    Could we send this information to you in [employer] or would you prefer to receive a phone call?:   Patient would prefer a phone call   Okay to leave a detailed message?: Yes at Cell number on file:    Telephone Information:   Mobile [phone]

## 2024-07-25 NOTE — TELEPHONE ENCOUNTER
Patient was called back and told that for procedures lie a colonoscopy it esmer need to be done at least 2 weeks before surgery or 2 weeks after surgery.  She stated that she can get the procedure done in October so he timing is just fine. She was thankful for the call back and had no other questions.

## 2024-08-08 NOTE — PROGRESS NOTES
Ortho Navigator Note      Pre-op Date 7/16/24     Medical Clearance  Cleared     Labs Stable      COVID Test Date No longer indicated      Skin  Intact      Activity: Ambulates independently without assistive device      Equipment Need Patient will likely need a walker for discharge. Defer to PT/OT for recs.       Meds to Hold Held all supplements 14 days prior to surgery  Tums-Hold am of surgery   * Medication recommendations are not intended to be exhaustive; they are limited to common medications that are potentially dangerous if incorrectly managed   NPO Instructions  Instructed to stop solids 8 hr prior to arrival and clears 2 hr prior to arrival.       Pre-op Joint Education Complete? Complete   Discharge Plan Patient has plan to discharge home on morning of POD 1.    and son  will arrive at hospital at 0800 to participate in therapy and discharge education. They will then transport patient home    /Transportation  and son will be coaches and transportation.  is physically able to care for patient.      Barriers to Discharge No barriers to discharge.      Additional Info/   Special Needs : Patient had no unanswered questions or concerns.           08/08/24 1532   Discharge Planning   Patient/Family Anticipates Transition to home with family   Concerns to be Addressed no discharge needs identified   Living Arrangements   People in Home spouse   Type of Residence Private Residence   Is your private residence a single family home or apartment? Single family home   Number of Stairs, Within Home, Primary greater than 10 stairs   Once home, are you able to live on one level? Yes   Which level? Main Level   Bathroom Shower/Tub Tub/Shower unit   Equipment Currently Used at Home none   Support System   Support Systems Spouse/Significant Other;Children   Do you have someone available to stay with you one or two nights once you are home? Yes   Medical Clearance   Date of Physical 07/16/24    Clinic Name PAC   It is recommended that you call and check with any specialty providers before surgery to see if you need surgical clearance.  Do you see any specialty providers outside of your primary care provider? No   Blood   Known Bleeding Disorder or Coagulopathy No   Does the patient have any Sabianism/cultural preferences related to blood products? No   Education   Has the patient scheduled or completed pre-op total joint education, either in class or online, in the last 12 months? Yes   Patient attended total joint pre-op class/received pre-op teaching  online   Relationship/Living Environment   Name(s) of People in Home Josymarii (spouse)

## 2024-08-12 ASSESSMENT — ENCOUNTER SYMPTOMS
SEIZURES: 0
DYSRHYTHMIAS: 1

## 2024-08-12 ASSESSMENT — LIFESTYLE VARIABLES: TOBACCO_USE: 1

## 2024-08-12 NOTE — ANESTHESIA PREPROCEDURE EVALUATION
Anesthesia Pre-Procedure Evaluation    Patient: Veronica Herrera   MRN: [id number] : 1965        Procedure : Procedure(s):  ARTHROPLASTY, HIP, TOTAL          Past Medical History:   Diagnosis Date    Depression     Eczema     Hearing problem     Nasal congestion     Obesity     Prediabetes     Primary osteoarthritis of left hip     PVC's (premature ventricular contractions)     Snoring     Sore throat     Tinnitus     Tobacco use       Past Surgical History:   Procedure Laterality Date    ANTERIOR CRUCIATE LIGAMENT REPAIR Right     ARTHROSCOPY KNEE Bilateral     COSMETIC ABDOMINOPLASTY  2019    FISSURECTOMY RECTUM N/A 2018    Procedure: EXAM UNDER ANESTHESIA WITH EXCISION OF SKIN TAG;  Surgeon: Bessy Carrillo MD;  Location: [facility];  Service:     LIPOSUCTION TRUNK      MAMMOPLASTY REDUCTION Bilateral     OTHER SURGICAL HISTORY Right     Rt hand reconstruction    TYMPANOPLASTY  2011    Procedure:TYMPANOPLASTY; Posterior Auricular with facial nerve monitoring *Latex Safe* tympanoplasty with canalplasty; Surgeon:DIANNA JOHNSTON; Location:[facility]      Allergies   Allergen Reactions    Ciprofloxacin Hives    Ciprofloxacin Rash and Hives      Social History     Tobacco Use    Smoking status: Some Days     Types: Cigarettes    Smokeless tobacco: Never    Tobacco comments:     1-5 cigarettes weekly social   Substance Use Topics    Alcohol use: Yes     Alcohol/week: 3.0 standard drinks of alcohol     Comment: Alcoholic Drinks/day: 3 days a week      Wt Readings from Last 1 Encounters:   24 99.8 kg (220 lb)        Anesthesia Evaluation   Pt has had prior anesthetic. Type: General and MAC.        ROS/MED HX  ENT/Pulmonary: Comment: S/p tympanoplasty  - neg pulmonary ROS   (+)                tobacco use, Current use,                       Neurologic:  - neg neurologic ROS  (-) no seizures, no CVA and no TIA   Cardiovascular:     (+)  - -   -  - -                        dysrhythmias  "(PVCs), Other,        Previous cardiac testing   Echo: Date: Results:    Stress Test:  Date: 2016 Results:   CONCLUSION:    Patient exercised minutes seconds on a Mina protocol.     Good functional aerobic capacity.     Began with chest tightness 2/10, no change with exercise or     recovery.    Negative ECG test for diagnostic ST depression.     Normal Stress Echo test, no evidence for ischemia.       ECG Reviewed:  Date: 3/2/23 Results:  Sinus rhythm with sinus arrhythmia   Normal ECG     Cath:  Date: Results:      METS/Exercise Tolerance: 4 - Raking leaves, gardening    Hematologic:  - neg hematologic  ROS     Musculoskeletal: Comment: Lumbar back pain     (+)  arthritis,             GI/Hepatic:     (+) GERD, Asymptomatic on medication,                  Renal/Genitourinary:  - neg Renal ROS     Endo: Comment: prediabetes    (+)               Obesity,       Psychiatric/Substance Use:     (+) psychiatric history depression       Infectious Disease:     (+)   MRSA,         Malignancy:  - neg malignancy ROS     Other:  - neg other ROS          Physical Exam    Airway        Mallampati: IV   TM distance: > 3 FB   Neck ROM: full   Mouth opening: > 3 cm    Respiratory Devices and Support         Dental       (+) Modest Abnormalities - crowns, retainers, 1 or 2 missing teeth      Cardiovascular   cardiovascular exam normal       Rhythm and rate: regular     Pulmonary   pulmonary exam normal                OUTSIDE LABS:  CBC: No results found for: \"WBC\", \"HGB\", \"HCT\", \"PLT\"  BMP: No results found for: \"NA\", \"POTASSIUM\", \"CHLORIDE\", \"CO2\", \"BUN\", \"CR\", \"GLC\"  COAGS: No results found for: \"PTT\", \"INR\", \"FIBR\"  POC:   Lab Results   Component Value Date    HCG Negative 08/17/2011     HEPATIC: No results found for: \"ALBUMIN\", \"PROTTOTAL\", \"ALT\", \"AST\", \"GGT\", \"ALKPHOS\", \"BILITOTAL\", \"BILIDIRECT\", \"VINCE\"  OTHER: No results found for: \"PH\", \"LACT\", \"A1C\", \"RAHEEL\", \"PHOS\", \"MAG\", \"LIPASE\", \"AMYLASE\", \"TSH\", \"T4\", \"T3\", \"CRP\", " "\"SED\"    Anesthesia Plan    ASA Status:  3    NPO Status:  NPO Appropriate    Anesthesia Type: General (And spinal if patient tolerates).     - Airway: ETT   Induction: Intravenous, Propofol.   Maintenance: Balanced.   Techniques and Equipment:     - Airway: Video-Laryngoscope     - Lines/Monitors: 2nd IV, BIS     Consents    Anesthesia Plan(s) and associated risks, benefits, and realistic alternatives discussed. Questions answered and patient/representative(s) expressed understanding.     - Discussed: Risks, Benefits and Alternatives for BOTH SEDATION and the PROCEDURE were discussed     - Discussed with:  Patient      - Extended Intubation/Ventilatory Support Discussed: Yes.      Use of blood products discussed: Yes.     - Discussed with: Patient.     Postoperative Care    Pain management: IV analgesics, Oral pain medications, Multi-modal analgesia.   PONV prophylaxis: Dexamethasone or Solumedrol, Ondansetron (or other 5HT-3)     Comments:               Samantha Seymour MD    I have reviewed the pertinent notes and labs in the chart from the past 30 days and (re)examined the patient.  Any updates or changes from those notes are reflected in this note.              # Obesity: Estimated body mass index is 35.51 kg/m  as calculated from the following:    Height as of 7/16/24: 1.676 m (5' 6\").    Weight as of 7/16/24: 99.8 kg (220 lb).      "

## 2024-08-13 ENCOUNTER — ANESTHESIA (OUTPATIENT)
Dept: SURGERY | Facility: CLINIC | Age: 59
End: 2024-08-13
Payer: COMMERCIAL

## 2024-08-13 ENCOUNTER — APPOINTMENT (OUTPATIENT)
Dept: PHYSICAL THERAPY | Facility: CLINIC | Age: 59
End: 2024-08-13
Attending: ORTHOPAEDIC SURGERY
Payer: COMMERCIAL

## 2024-08-13 ENCOUNTER — HOSPITAL ENCOUNTER (OUTPATIENT)
Facility: CLINIC | Age: 59
Discharge: HOME | End: 2024-08-14
Attending: ORTHOPAEDIC SURGERY | Admitting: ORTHOPAEDIC SURGERY
Payer: COMMERCIAL

## 2024-08-13 ENCOUNTER — APPOINTMENT (OUTPATIENT)
Dept: GENERAL RADIOLOGY | Facility: CLINIC | Age: 59
End: 2024-08-13
Attending: ORTHOPAEDIC SURGERY
Payer: COMMERCIAL

## 2024-08-13 DIAGNOSIS — M16.12 PRIMARY OSTEOARTHRITIS OF LEFT HIP: ICD-10-CM

## 2024-08-13 DIAGNOSIS — Z96.642 S/P TOTAL LEFT HIP ARTHROPLASTY: Primary | ICD-10-CM

## 2024-08-13 LAB
ABO/RH(D): NORMAL
ANION GAP SERPL CALCULATED.3IONS-SCNC: 11 MMOL/L (ref 7–15)
ANTIBODY SCREEN: NEGATIVE
BASOPHILS # BLD AUTO: 0.1 10E3/UL (ref 0–0.2)
BASOPHILS NFR BLD AUTO: 1 %
BUN SERPL-MCNC: 16.8 MG/DL (ref 6–20)
CALCIUM SERPL-MCNC: 9.8 MG/DL (ref 8.8–10.4)
CHLORIDE SERPL-SCNC: 104 MMOL/L (ref 98–107)
CREAT SERPL-MCNC: 0.83 MG/DL (ref 0.51–0.95)
EGFRCR SERPLBLD CKD-EPI 2021: 82 ML/MIN/1.73M2
EOSINOPHIL # BLD AUTO: 0.3 10E3/UL (ref 0–0.7)
EOSINOPHIL NFR BLD AUTO: 6 %
ERYTHROCYTE [DISTWIDTH] IN BLOOD BY AUTOMATED COUNT: 11.9 % (ref 10–15)
GLUCOSE BLDC GLUCOMTR-MCNC: 104 MG/DL (ref 70–99)
GLUCOSE SERPL-MCNC: 98 MG/DL (ref 70–99)
HCO3 SERPL-SCNC: 24 MMOL/L (ref 22–29)
HCT VFR BLD AUTO: 40.6 % (ref 35–47)
HGB BLD-MCNC: 14.1 G/DL (ref 11.7–15.7)
IMM GRANULOCYTES # BLD: 0 10E3/UL
IMM GRANULOCYTES NFR BLD: 0 %
LYMPHOCYTES # BLD AUTO: 2.2 10E3/UL (ref 0.8–5.3)
LYMPHOCYTES NFR BLD AUTO: 38 %
MCH RBC QN AUTO: 30.9 PG (ref 26.5–33)
MCHC RBC AUTO-ENTMCNC: 34.7 G/DL (ref 31.5–36.5)
MCV RBC AUTO: 89 FL (ref 78–100)
MONOCYTES # BLD AUTO: 0.5 10E3/UL (ref 0–1.3)
MONOCYTES NFR BLD AUTO: 9 %
NEUTROPHILS # BLD AUTO: 2.6 10E3/UL (ref 1.6–8.3)
NEUTROPHILS NFR BLD AUTO: 46 %
NRBC # BLD AUTO: 0 10E3/UL
NRBC BLD AUTO-RTO: 0 /100
PLATELET # BLD AUTO: 193 10E3/UL (ref 150–450)
POTASSIUM SERPL-SCNC: 4.2 MMOL/L (ref 3.4–5.3)
RBC # BLD AUTO: 4.57 10E6/UL (ref 3.8–5.2)
SODIUM SERPL-SCNC: 139 MMOL/L (ref 135–145)
SPECIMEN EXPIRATION DATE: NORMAL
WBC # BLD AUTO: 5.7 10E3/UL (ref 4–11)

## 2024-08-13 PROCEDURE — 370N000017 HC ANESTHESIA TECHNICAL FEE, PER MIN: Performed by: ORTHOPAEDIC SURGERY

## 2024-08-13 PROCEDURE — 250N000013 HC RX MED GY IP 250 OP 250 PS 637: Performed by: STUDENT IN AN ORGANIZED HEALTH CARE EDUCATION/TRAINING PROGRAM

## 2024-08-13 PROCEDURE — 250N000011 HC RX IP 250 OP 636

## 2024-08-13 PROCEDURE — 97161 PT EVAL LOW COMPLEX 20 MIN: CPT | Mod: GP

## 2024-08-13 PROCEDURE — 250N000013 HC RX MED GY IP 250 OP 250 PS 637: Performed by: PHYSICIAN ASSISTANT

## 2024-08-13 PROCEDURE — 710N000010 HC RECOVERY PHASE 1, LEVEL 2, PER MIN: Performed by: ORTHOPAEDIC SURGERY

## 2024-08-13 PROCEDURE — 82962 GLUCOSE BLOOD TEST: CPT

## 2024-08-13 PROCEDURE — 99244 OFF/OP CNSLTJ NEW/EST MOD 40: CPT | Performed by: PHYSICIAN ASSISTANT

## 2024-08-13 PROCEDURE — C1776 JOINT DEVICE (IMPLANTABLE): HCPCS | Performed by: ORTHOPAEDIC SURGERY

## 2024-08-13 PROCEDURE — 80048 BASIC METABOLIC PNL TOTAL CA: CPT | Performed by: ANESTHESIOLOGY

## 2024-08-13 PROCEDURE — 86900 BLOOD TYPING SEROLOGIC ABO: CPT

## 2024-08-13 PROCEDURE — 272N000001 HC OR GENERAL SUPPLY STERILE: Performed by: ORTHOPAEDIC SURGERY

## 2024-08-13 PROCEDURE — 250N000025 HC SEVOFLURANE, PER MIN: Performed by: ORTHOPAEDIC SURGERY

## 2024-08-13 PROCEDURE — 250N000011 HC RX IP 250 OP 636: Performed by: STUDENT IN AN ORGANIZED HEALTH CARE EDUCATION/TRAINING PROGRAM

## 2024-08-13 PROCEDURE — 360N000077 HC SURGERY LEVEL 4, PER MIN: Performed by: ORTHOPAEDIC SURGERY

## 2024-08-13 PROCEDURE — 36415 COLL VENOUS BLD VENIPUNCTURE: CPT | Performed by: ANESTHESIOLOGY

## 2024-08-13 PROCEDURE — 250N000009 HC RX 250: Performed by: ORTHOPAEDIC SURGERY

## 2024-08-13 PROCEDURE — 27130 TOTAL HIP ARTHROPLASTY: CPT | Performed by: ANESTHESIOLOGY

## 2024-08-13 PROCEDURE — 250N000011 HC RX IP 250 OP 636: Performed by: ORTHOPAEDIC SURGERY

## 2024-08-13 PROCEDURE — 250N000009 HC RX 250

## 2024-08-13 PROCEDURE — 85025 COMPLETE CBC W/AUTO DIFF WBC: CPT | Performed by: ANESTHESIOLOGY

## 2024-08-13 PROCEDURE — 999N000141 HC STATISTIC PRE-PROCEDURE NURSING ASSESSMENT: Performed by: ORTHOPAEDIC SURGERY

## 2024-08-13 PROCEDURE — 250N000011 HC RX IP 250 OP 636: Performed by: PHYSICIAN ASSISTANT

## 2024-08-13 PROCEDURE — 97530 THERAPEUTIC ACTIVITIES: CPT | Mod: GP

## 2024-08-13 PROCEDURE — 97110 THERAPEUTIC EXERCISES: CPT | Mod: GP

## 2024-08-13 PROCEDURE — 258N000003 HC RX IP 258 OP 636

## 2024-08-13 PROCEDURE — 258N000003 HC RX IP 258 OP 636: Performed by: ORTHOPAEDIC SURGERY

## 2024-08-13 PROCEDURE — 258N000003 HC RX IP 258 OP 636: Performed by: STUDENT IN AN ORGANIZED HEALTH CARE EDUCATION/TRAINING PROGRAM

## 2024-08-13 PROCEDURE — 999N000065 XR PELVIS AND HIP PORTABLE LEFT 2 VIEWS

## 2024-08-13 DEVICE — IMPLANTABLE DEVICE
Type: IMPLANTABLE DEVICE | Site: HIP | Status: FUNCTIONAL
Brand: G7® ACETABULAR SYSTEM

## 2024-08-13 DEVICE — IMPLANTABLE DEVICE
Type: IMPLANTABLE DEVICE | Site: HIP | Status: FUNCTIONAL
Brand: TAPERLOC COMPLETE PRIMARY FEMORAL

## 2024-08-13 DEVICE — IMPLANTABLE DEVICE
Type: IMPLANTABLE DEVICE | Site: HIP | Status: FUNCTIONAL
Brand: BIOLOX® DELTA MODULAR CERAMIC HEAD

## 2024-08-13 DEVICE — IMPLANTABLE DEVICE
Type: IMPLANTABLE DEVICE | Site: HIP | Status: FUNCTIONAL
Brand: G7® VIVACIT-E®

## 2024-08-13 RX ORDER — ONDANSETRON 4 MG/1
4 TABLET, ORALLY DISINTEGRATING ORAL EVERY 30 MIN PRN
Status: DISCONTINUED | OUTPATIENT
Start: 2024-08-13 | End: 2024-08-13 | Stop reason: HOSPADM

## 2024-08-13 RX ORDER — GABAPENTIN 100 MG/1
100 CAPSULE ORAL 3 TIMES DAILY
Status: DISCONTINUED | OUTPATIENT
Start: 2024-08-13 | End: 2024-08-14 | Stop reason: HOSPADM

## 2024-08-13 RX ORDER — PHENYLEPHRINE HCL IN 0.9% NACL 50MG/250ML
.5-1.25 PLASTIC BAG, INJECTION (ML) INTRAVENOUS CONTINUOUS
Status: DISCONTINUED | OUTPATIENT
Start: 2024-08-13 | End: 2024-08-13 | Stop reason: DRUGHIGH

## 2024-08-13 RX ORDER — HYDROMORPHONE HYDROCHLORIDE 1 MG/ML
0.2 INJECTION, SOLUTION INTRAMUSCULAR; INTRAVENOUS; SUBCUTANEOUS
Status: DISCONTINUED | OUTPATIENT
Start: 2024-08-13 | End: 2024-08-14 | Stop reason: HOSPADM

## 2024-08-13 RX ORDER — CEFAZOLIN SODIUM/WATER 2 G/20 ML
2 SYRINGE (ML) INTRAVENOUS
Status: COMPLETED | OUTPATIENT
Start: 2024-08-13 | End: 2024-08-13

## 2024-08-13 RX ORDER — NALOXONE HYDROCHLORIDE 0.4 MG/ML
0.4 INJECTION, SOLUTION INTRAMUSCULAR; INTRAVENOUS; SUBCUTANEOUS
Status: DISCONTINUED | OUTPATIENT
Start: 2024-08-13 | End: 2024-08-14 | Stop reason: HOSPADM

## 2024-08-13 RX ORDER — LIDOCAINE 40 MG/G
CREAM TOPICAL
Status: DISCONTINUED | OUTPATIENT
Start: 2024-08-13 | End: 2024-08-14 | Stop reason: HOSPADM

## 2024-08-13 RX ORDER — SODIUM CHLORIDE, SODIUM LACTATE, POTASSIUM CHLORIDE, CALCIUM CHLORIDE 600; 310; 30; 20 MG/100ML; MG/100ML; MG/100ML; MG/100ML
INJECTION, SOLUTION INTRAVENOUS CONTINUOUS
Status: DISCONTINUED | OUTPATIENT
Start: 2024-08-13 | End: 2024-08-14 | Stop reason: HOSPADM

## 2024-08-13 RX ORDER — CELECOXIB 200 MG/1
400 CAPSULE ORAL ONCE
Status: COMPLETED | OUTPATIENT
Start: 2024-08-13 | End: 2024-08-13

## 2024-08-13 RX ORDER — SODIUM CHLORIDE, SODIUM LACTATE, POTASSIUM CHLORIDE, CALCIUM CHLORIDE 600; 310; 30; 20 MG/100ML; MG/100ML; MG/100ML; MG/100ML
INJECTION, SOLUTION INTRAVENOUS CONTINUOUS PRN
Status: DISCONTINUED | OUTPATIENT
Start: 2024-08-13 | End: 2024-08-13

## 2024-08-13 RX ORDER — FENTANYL CITRATE 50 UG/ML
INJECTION, SOLUTION INTRAMUSCULAR; INTRAVENOUS PRN
Status: DISCONTINUED | OUTPATIENT
Start: 2024-08-13 | End: 2024-08-13

## 2024-08-13 RX ORDER — SODIUM CHLORIDE, SODIUM LACTATE, POTASSIUM CHLORIDE, CALCIUM CHLORIDE 600; 310; 30; 20 MG/100ML; MG/100ML; MG/100ML; MG/100ML
INJECTION, SOLUTION INTRAVENOUS CONTINUOUS
Status: DISCONTINUED | OUTPATIENT
Start: 2024-08-13 | End: 2024-08-13 | Stop reason: HOSPADM

## 2024-08-13 RX ORDER — DIPHENHYDRAMINE HCL 25 MG
25 CAPSULE ORAL EVERY 6 HOURS PRN
Status: DISCONTINUED | OUTPATIENT
Start: 2024-08-13 | End: 2024-08-14 | Stop reason: HOSPADM

## 2024-08-13 RX ORDER — NALOXONE HYDROCHLORIDE 0.4 MG/ML
0.2 INJECTION, SOLUTION INTRAMUSCULAR; INTRAVENOUS; SUBCUTANEOUS
Status: DISCONTINUED | OUTPATIENT
Start: 2024-08-13 | End: 2024-08-14 | Stop reason: HOSPADM

## 2024-08-13 RX ORDER — ONDANSETRON 2 MG/ML
4 INJECTION INTRAMUSCULAR; INTRAVENOUS EVERY 6 HOURS PRN
Status: DISCONTINUED | OUTPATIENT
Start: 2024-08-13 | End: 2024-08-14 | Stop reason: HOSPADM

## 2024-08-13 RX ORDER — FENTANYL CITRATE 50 UG/ML
25 INJECTION, SOLUTION INTRAMUSCULAR; INTRAVENOUS EVERY 5 MIN PRN
Status: DISCONTINUED | OUTPATIENT
Start: 2024-08-13 | End: 2024-08-13 | Stop reason: HOSPADM

## 2024-08-13 RX ORDER — NAPROXEN 250 MG/1
250 TABLET ORAL EVERY 12 HOURS PRN
Status: DISCONTINUED | OUTPATIENT
Start: 2024-08-13 | End: 2024-08-14 | Stop reason: HOSPADM

## 2024-08-13 RX ORDER — POLYETHYLENE GLYCOL 3350 17 G/17G
17 POWDER, FOR SOLUTION ORAL DAILY
Status: DISCONTINUED | OUTPATIENT
Start: 2024-08-14 | End: 2024-08-14 | Stop reason: HOSPADM

## 2024-08-13 RX ORDER — ACETAMINOPHEN 325 MG/1
975 TABLET ORAL EVERY 8 HOURS
Status: DISCONTINUED | OUTPATIENT
Start: 2024-08-13 | End: 2024-08-14 | Stop reason: HOSPADM

## 2024-08-13 RX ORDER — PROPOFOL 10 MG/ML
INJECTION, EMULSION INTRAVENOUS PRN
Status: DISCONTINUED | OUTPATIENT
Start: 2024-08-13 | End: 2024-08-13

## 2024-08-13 RX ORDER — OXYCODONE HYDROCHLORIDE 5 MG/1
5 TABLET ORAL
Status: DISCONTINUED | OUTPATIENT
Start: 2024-08-13 | End: 2024-08-13 | Stop reason: HOSPADM

## 2024-08-13 RX ORDER — PROCHLORPERAZINE MALEATE 5 MG
10 TABLET ORAL EVERY 6 HOURS PRN
Status: DISCONTINUED | OUTPATIENT
Start: 2024-08-13 | End: 2024-08-14 | Stop reason: HOSPADM

## 2024-08-13 RX ORDER — NALOXONE HYDROCHLORIDE 0.4 MG/ML
0.1 INJECTION, SOLUTION INTRAMUSCULAR; INTRAVENOUS; SUBCUTANEOUS
Status: DISCONTINUED | OUTPATIENT
Start: 2024-08-13 | End: 2024-08-13 | Stop reason: HOSPADM

## 2024-08-13 RX ORDER — ACETAMINOPHEN 325 MG/1
975 TABLET ORAL ONCE
Status: DISCONTINUED | OUTPATIENT
Start: 2024-08-13 | End: 2024-08-13

## 2024-08-13 RX ORDER — OXYCODONE HYDROCHLORIDE 5 MG/1
5 TABLET ORAL EVERY 4 HOURS PRN
Status: DISCONTINUED | OUTPATIENT
Start: 2024-08-13 | End: 2024-08-14 | Stop reason: HOSPADM

## 2024-08-13 RX ORDER — CEFAZOLIN SODIUM/WATER 2 G/20 ML
2 SYRINGE (ML) INTRAVENOUS SEE ADMIN INSTRUCTIONS
Status: DISCONTINUED | OUTPATIENT
Start: 2024-08-13 | End: 2024-08-13 | Stop reason: HOSPADM

## 2024-08-13 RX ORDER — ACETAMINOPHEN 325 MG/1
975 TABLET ORAL ONCE
Status: COMPLETED | OUTPATIENT
Start: 2024-08-13 | End: 2024-08-13

## 2024-08-13 RX ORDER — HYDROMORPHONE HYDROCHLORIDE 1 MG/ML
0.2 INJECTION, SOLUTION INTRAMUSCULAR; INTRAVENOUS; SUBCUTANEOUS EVERY 5 MIN PRN
Status: DISCONTINUED | OUTPATIENT
Start: 2024-08-13 | End: 2024-08-13 | Stop reason: HOSPADM

## 2024-08-13 RX ORDER — BISACODYL 10 MG
10 SUPPOSITORY, RECTAL RECTAL DAILY PRN
Status: DISCONTINUED | OUTPATIENT
Start: 2024-08-16 | End: 2024-08-14 | Stop reason: HOSPADM

## 2024-08-13 RX ORDER — AMOXICILLIN 250 MG
1 CAPSULE ORAL 2 TIMES DAILY
Status: DISCONTINUED | OUTPATIENT
Start: 2024-08-13 | End: 2024-08-14 | Stop reason: HOSPADM

## 2024-08-13 RX ORDER — CEFAZOLIN SODIUM 2 G/100ML
2 INJECTION, SOLUTION INTRAVENOUS EVERY 8 HOURS
Status: COMPLETED | OUTPATIENT
Start: 2024-08-13 | End: 2024-08-14

## 2024-08-13 RX ORDER — ASPIRIN 81 MG/1
81 TABLET ORAL 2 TIMES DAILY
Status: DISCONTINUED | OUTPATIENT
Start: 2024-08-13 | End: 2024-08-14 | Stop reason: HOSPADM

## 2024-08-13 RX ORDER — METHOCARBAMOL 750 MG/1
750 TABLET, FILM COATED ORAL EVERY 6 HOURS PRN
Status: DISCONTINUED | OUTPATIENT
Start: 2024-08-13 | End: 2024-08-14 | Stop reason: HOSPADM

## 2024-08-13 RX ORDER — MAGNESIUM HYDROXIDE 1200 MG/15ML
LIQUID ORAL PRN
Status: DISCONTINUED | OUTPATIENT
Start: 2024-08-13 | End: 2024-08-13 | Stop reason: HOSPADM

## 2024-08-13 RX ORDER — ONDANSETRON 4 MG/1
4 TABLET, ORALLY DISINTEGRATING ORAL EVERY 6 HOURS PRN
Status: DISCONTINUED | OUTPATIENT
Start: 2024-08-13 | End: 2024-08-14 | Stop reason: HOSPADM

## 2024-08-13 RX ORDER — OXYCODONE HYDROCHLORIDE 10 MG/1
10 TABLET ORAL EVERY 4 HOURS PRN
Status: DISCONTINUED | OUTPATIENT
Start: 2024-08-13 | End: 2024-08-14 | Stop reason: HOSPADM

## 2024-08-13 RX ORDER — ACETAMINOPHEN 325 MG/1
650 TABLET ORAL EVERY 4 HOURS PRN
Status: DISCONTINUED | OUTPATIENT
Start: 2024-08-16 | End: 2024-08-14 | Stop reason: HOSPADM

## 2024-08-13 RX ORDER — LIDOCAINE 40 MG/G
CREAM TOPICAL
Status: DISCONTINUED | OUTPATIENT
Start: 2024-08-13 | End: 2024-08-13 | Stop reason: HOSPADM

## 2024-08-13 RX ORDER — DEXAMETHASONE SODIUM PHOSPHATE 4 MG/ML
4 INJECTION, SOLUTION INTRA-ARTICULAR; INTRALESIONAL; INTRAMUSCULAR; INTRAVENOUS; SOFT TISSUE
Status: DISCONTINUED | OUTPATIENT
Start: 2024-08-13 | End: 2024-08-13 | Stop reason: HOSPADM

## 2024-08-13 RX ORDER — HYDROMORPHONE HYDROCHLORIDE 1 MG/ML
0.4 INJECTION, SOLUTION INTRAMUSCULAR; INTRAVENOUS; SUBCUTANEOUS EVERY 5 MIN PRN
Status: DISCONTINUED | OUTPATIENT
Start: 2024-08-13 | End: 2024-08-13 | Stop reason: HOSPADM

## 2024-08-13 RX ORDER — SENNOSIDES 8.6 MG
1-2 TABLET ORAL 2 TIMES DAILY PRN
Status: DISCONTINUED | OUTPATIENT
Start: 2024-08-13 | End: 2024-08-14 | Stop reason: HOSPADM

## 2024-08-13 RX ORDER — DEXAMETHASONE SODIUM PHOSPHATE 4 MG/ML
INJECTION, SOLUTION INTRA-ARTICULAR; INTRALESIONAL; INTRAMUSCULAR; INTRAVENOUS; SOFT TISSUE PRN
Status: DISCONTINUED | OUTPATIENT
Start: 2024-08-13 | End: 2024-08-13

## 2024-08-13 RX ORDER — LABETALOL HYDROCHLORIDE 5 MG/ML
10 INJECTION, SOLUTION INTRAVENOUS
Status: DISCONTINUED | OUTPATIENT
Start: 2024-08-13 | End: 2024-08-13 | Stop reason: HOSPADM

## 2024-08-13 RX ORDER — TRANEXAMIC ACID 650 MG/1
1950 TABLET ORAL ONCE
Status: COMPLETED | OUTPATIENT
Start: 2024-08-13 | End: 2024-08-13

## 2024-08-13 RX ORDER — ONDANSETRON 2 MG/ML
4 INJECTION INTRAMUSCULAR; INTRAVENOUS EVERY 30 MIN PRN
Status: DISCONTINUED | OUTPATIENT
Start: 2024-08-13 | End: 2024-08-13 | Stop reason: HOSPADM

## 2024-08-13 RX ORDER — FENTANYL CITRATE 50 UG/ML
50 INJECTION, SOLUTION INTRAMUSCULAR; INTRAVENOUS EVERY 5 MIN PRN
Status: DISCONTINUED | OUTPATIENT
Start: 2024-08-13 | End: 2024-08-13 | Stop reason: HOSPADM

## 2024-08-13 RX ORDER — LIDOCAINE HYDROCHLORIDE 20 MG/ML
INJECTION, SOLUTION INFILTRATION; PERINEURAL PRN
Status: DISCONTINUED | OUTPATIENT
Start: 2024-08-13 | End: 2024-08-13

## 2024-08-13 RX ORDER — HYDROMORPHONE HYDROCHLORIDE 1 MG/ML
0.4 INJECTION, SOLUTION INTRAMUSCULAR; INTRAVENOUS; SUBCUTANEOUS
Status: DISCONTINUED | OUTPATIENT
Start: 2024-08-13 | End: 2024-08-14 | Stop reason: HOSPADM

## 2024-08-13 RX ORDER — CALCIUM CARBONATE 500 MG/1
500 TABLET, CHEWABLE ORAL 3 TIMES DAILY PRN
Status: DISCONTINUED | OUTPATIENT
Start: 2024-08-13 | End: 2024-08-14 | Stop reason: HOSPADM

## 2024-08-13 RX ADMIN — Medication 2 G: at 08:36

## 2024-08-13 RX ADMIN — SODIUM CHLORIDE, POTASSIUM CHLORIDE, SODIUM LACTATE AND CALCIUM CHLORIDE: 600; 310; 30; 20 INJECTION, SOLUTION INTRAVENOUS at 17:22

## 2024-08-13 RX ADMIN — Medication 1 LOZENGE: at 20:11

## 2024-08-13 RX ADMIN — SENNOSIDES AND DOCUSATE SODIUM 1 TABLET: 50; 8.6 TABLET ORAL at 20:11

## 2024-08-13 RX ADMIN — OXYCODONE HYDROCHLORIDE 5 MG: 5 TABLET ORAL at 22:43

## 2024-08-13 RX ADMIN — ACETAMINOPHEN 975 MG: 325 TABLET, FILM COATED ORAL at 17:21

## 2024-08-13 RX ADMIN — ONDANSETRON 4 MG: 2 INJECTION INTRAMUSCULAR; INTRAVENOUS at 10:42

## 2024-08-13 RX ADMIN — Medication 1 LOZENGE: at 22:43

## 2024-08-13 RX ADMIN — CELECOXIB 400 MG: 200 CAPSULE ORAL at 07:54

## 2024-08-13 RX ADMIN — PROPOFOL 190 MG: 10 INJECTION, EMULSION INTRAVENOUS at 08:36

## 2024-08-13 RX ADMIN — SODIUM CHLORIDE, SODIUM LACTATE, POTASSIUM CHLORIDE, CALCIUM CHLORIDE: 600; 310; 30; 20 INJECTION, SOLUTION INTRAVENOUS at 08:39

## 2024-08-13 RX ADMIN — LIDOCAINE HYDROCHLORIDE 100 MG: 20 INJECTION, SOLUTION INFILTRATION; PERINEURAL at 08:36

## 2024-08-13 RX ADMIN — HYDROMORPHONE HYDROCHLORIDE 0.4 MG: 1 INJECTION, SOLUTION INTRAMUSCULAR; INTRAVENOUS; SUBCUTANEOUS at 12:39

## 2024-08-13 RX ADMIN — DEXAMETHASONE SODIUM PHOSPHATE 4 MG: 4 INJECTION, SOLUTION INTRAMUSCULAR; INTRAVENOUS at 09:14

## 2024-08-13 RX ADMIN — ACETAMINOPHEN 975 MG: 325 TABLET, FILM COATED ORAL at 07:51

## 2024-08-13 RX ADMIN — SODIUM CHLORIDE, POTASSIUM CHLORIDE, SODIUM LACTATE AND CALCIUM CHLORIDE: 600; 310; 30; 20 INJECTION, SOLUTION INTRAVENOUS at 08:28

## 2024-08-13 RX ADMIN — TRANEXAMIC ACID 1950 MG: 650 TABLET ORAL at 07:52

## 2024-08-13 RX ADMIN — HYDROMORPHONE HYDROCHLORIDE 0.5 MG: 1 INJECTION, SOLUTION INTRAMUSCULAR; INTRAVENOUS; SUBCUTANEOUS at 09:14

## 2024-08-13 RX ADMIN — HYDROMORPHONE HYDROCHLORIDE 0.4 MG: 1 INJECTION, SOLUTION INTRAMUSCULAR; INTRAVENOUS; SUBCUTANEOUS at 11:35

## 2024-08-13 RX ADMIN — GABAPENTIN 100 MG: 100 CAPSULE ORAL at 20:11

## 2024-08-13 RX ADMIN — Medication 50 MG: at 08:36

## 2024-08-13 RX ADMIN — GABAPENTIN 100 MG: 100 CAPSULE ORAL at 17:21

## 2024-08-13 RX ADMIN — ASPIRIN 81 MG: 81 TABLET, COATED ORAL at 20:11

## 2024-08-13 RX ADMIN — CEFAZOLIN SODIUM 2 G: 2 INJECTION, SOLUTION INTRAVENOUS at 20:11

## 2024-08-13 RX ADMIN — DEXMEDETOMIDINE HYDROCHLORIDE 8 MCG: 100 INJECTION, SOLUTION INTRAVENOUS at 09:55

## 2024-08-13 RX ADMIN — DEXMEDETOMIDINE HYDROCHLORIDE 8 MCG: 100 INJECTION, SOLUTION INTRAVENOUS at 10:43

## 2024-08-13 RX ADMIN — FENTANYL CITRATE 100 MCG: 50 INJECTION INTRAMUSCULAR; INTRAVENOUS at 08:36

## 2024-08-13 ASSESSMENT — ACTIVITIES OF DAILY LIVING (ADL)
ADLS_ACUITY_SCORE: 27
ADLS_ACUITY_SCORE: 32
ADLS_ACUITY_SCORE: 27
ADLS_ACUITY_SCORE: 27
ADLS_ACUITY_SCORE: 30
ADLS_ACUITY_SCORE: 30
ADLS_ACUITY_SCORE: 27
ADLS_ACUITY_SCORE: 32
ADLS_ACUITY_SCORE: 27
ADLS_ACUITY_SCORE: 27

## 2024-08-13 NOTE — ANESTHESIA CARE TRANSFER NOTE
Patient: Veronica Herrera    Procedure: Procedure(s):  ARTHROPLASTY, HIP, TOTAL       Diagnosis: Primary osteoarthritis of left hip [M16.12]  Diagnosis Additional Information: No value filed.    Anesthesia Type:   General     Note:    Oropharynx: oropharynx clear of all foreign objects and spontaneously breathing  Level of Consciousness: awake and drowsy  Oxygen Supplementation: face mask  Level of Supplemental Oxygen (L/min / FiO2): 6  Independent Airway: airway patency satisfactory and stable  Dentition: dentition unchanged  Vital Signs Stable: post-procedure vital signs reviewed and stable  Report to RN Given: handoff report given  Patient transferred to: PACU    Handoff Report: Identifed the Patient, Identified the Reponsible Provider, Reviewed the pertinent medical history, Discussed the surgical course, Reviewed Intra-OP anesthesia mangement and issues during anesthesia, Set expectations for post-procedure period and Allowed opportunity for questions and acknowledgement of understanding      Vitals:  Vitals Value Taken Time   BP     Temp     Pulse 70 08/13/24 1115   Resp 11 08/13/24 1115   SpO2 100 % 08/13/24 1115   Vitals shown include unfiled device data.    Electronically Signed By: Samantha Seymour MD  August 13, 2024  11:15 AM

## 2024-08-13 NOTE — PLAN OF CARE
5ortho Admission Note    Reason for admission: L AVIS  Primary team notified of pt arrival.  Admitted from: PACU  Via: hospital staff  Accompanied by: spouse and friend   Belongings: Placed in closet; valuables sent home with family  Admission Required Doc Completed: Yes/No  Teaching: Orientation to unit and call light- call light within reach, use of console, meal times, when to call for the RN, and enforced importance of safety.  IV Access: R and L hand  Telemetry: No  Ht./Wt.: Completed  Code Status verified on armband: Yes  2 RN Skin Assessment Completed with: TANNA Olguin  Suction/Ambu bag/Flowmeter at bedside: Yes    Pt status: arrived on unit at 1400. Pt is A&Ox4. Pt ambulated to bathroom with PT, reported dizziness that was resolved once pt was back in bed. Dressing to L hip was CDI. Pt is able to make needs known, call light within reach and used appropriately. Continue POC.      Temp:  [97.7  F (36.5  C)-98.5  F (36.9  C)] 98.3  F (36.8  C)  Pulse:  [63-82] 73  Resp:  [9-16] 16  BP: ()/(66-84) 126/77  SpO2:  [94 %-100 %] 99 %

## 2024-08-13 NOTE — OR NURSING
Unable to see any orders or mention in the notes for a post-op L hip xray.I called, left messages and texted Dr Hopkins and Dr Lamin Lemos. No answers received. Pt will be ready to go upstairs soon.      Response received @1313, Xray ordered.

## 2024-08-13 NOTE — PROGRESS NOTES
[facility]  OUTPATIENT PHYSICAL THERAPY EVALUATION  PLAN OF TREATMENT FOR OUTPATIENT REHABILITATION  (COMPLETE FOR INITIAL CLAIMS ONLY)  Patient's Last Name, First Name, M.I.  YOB: 1965  JavierVeronica  FR[facility]                        Provider's Name  [facility] Medical Record No.  [id number]                             Onset Date:  08/13/24   Start of Care Date:      Type:     _X_PT   ___OT   ___SLP Medical Diagnosis:                 PT Diagnosis:  impaired functional mobility Visits from SOC:  1     See note for plan of treatment, functional goals and certification details    I CERTIFY THE NEED FOR THESE SERVICES FURNISHED UNDER        THIS PLAN OF TREATMENT AND WHILE UNDER MY CARE     (Physician co-signature of this document indicates review and certification of the therapy plan).                  08/13/24 1505   Appointment Info   Signing Clinician's Name / Credentials (PT) Eula Soria DPT       Present no   Language English   Living Environment   People in Home spouse   Current Living Arrangements house   Home Accessibility stairs to enter home;stairs within home   Number of Stairs, Main Entrance 2   Stair Railings, Main Entrance railings safe and in good condition;railing on left side (ascending)   Number of Stairs, Within Home, Primary greater than 10 stairs   Stair Railings, Within Home, Primary railings safe and in good condition   Transportation Anticipated family or friend will provide   Self-Care   Usual Activity Tolerance good   Current Activity Tolerance fair   Regular Exercise No   Equipment Currently Used at Home none   Fall history within last six months no   General Information   Onset of Illness/Injury or Date of Surgery 08/13/24   Referring Physician Lamin Lemos MD   Patient/Family Therapy Goals Statement (PT) Did not endorse   Pertinent History of Current Problem (include personal factors  and/or comorbidities that impact the POC) s/p L AVIS   Existing Precautions/Restrictions fall;no hip IR;no hip ADD past midline;90 degree hip flexion;no pivoting or twisting   Weight-Bearing Status - LUE full weight-bearing   Weight-Bearing Status - RUE full weight-bearing   Weight-Bearing Status - LLE weight-bearing as tolerated   Weight-Bearing Status - RLE full weight-bearing   General Observations Supine in bed upon arrival, agreeable to PT   Cognition   Affect/Mental Status (Cognition) WNL   Orientation Status (Cognition) oriented x 3   Follows Commands (Cognition) WNL   Pain Assessment   Patient Currently in Pain Yes, see Vital Sign flowsheet   Integumentary/Edema   Integumentary/Edema Comments Patient with normal post-surgical swelling present   Posture    Posture Forward head position;Protracted shoulders;Kyphosis   Posture Comments Mild sitting EOB and standing   Range of Motion (ROM)   ROM Comment Did not formally assess, demonstrates functional ROM within precautions with mobility   Strength (Manual Muscle Testing)   Strength Comments Did not formally assess, demonstrates functional strength with mobility   Bed Mobility   Comment, (Bed Mobility) Completes supine<>sit transfer with HOB elevated and min-modA to L LE only   Transfers   Comment, (Transfers) Completes sit<>stand transfer with CGA and use of walker   Gait/Stairs (Locomotion)   Comment, (Gait/Stairs) Patient tolerated short distance ambulation to/from bathroom with walker and close SBA   Balance   Balance Comments Supervision sitting balance, CGA for standing balance with UE support from walker   Sensory Examination   Sensory Perception WNL   Clinical Impression   Criteria for Skilled Therapeutic Intervention Yes, treatment indicated   PT Diagnosis (PT) impaired functional mobility   Influenced by the following impairments increased post-op pain, precautions, decreased L LE strength and ROM   Functional limitations due to impairments impaired bed  mobility, transfers and ambulation   Clinical Presentation (PT Evaluation Complexity) stable   Clinical Presentation Rationale Per clinical judgment   Clinical Decision Making (Complexity) low complexity   Planned Therapy Interventions (PT) balance training;bed mobility training;gait training;home exercise program;stair training;strengthening;transfer training;progressive activity/exercise;risk factor education;home program guidelines   Risk & Benefits of therapy have been explained evaluation/treatment results reviewed;care plan/treatment goals reviewed;risks/benefits reviewed;current/potential barriers reviewed   PT Total Evaluation Time   PT Eval, Low Complexity Minutes (50063) 7   Physical Therapy Goals   PT Frequency 2x/day   PT Predicted Duration/Target Date for Goal Attainment 08/15/24   PT Goals Bed Mobility;Transfers;Gait;Stairs   PT: Bed Mobility Supervision/stand-by assist;Supine to/from sit;Within precautions   PT: Transfers Supervision/stand-by assist;Sit to/from stand;Bed to/from chair;Assistive device;Within precautions   PT: Gait Supervision/stand-by assist;Assistive device;100 feet   PT: Stairs Supervision/stand-by assist;Rail on left;Assistive device;2 stairs   Therapeutic Procedure/Exercise   Ther. Procedure: strength, endurance, ROM, flexibillity Minutes (28976) 9   Symptoms Noted During/After Treatment fatigue;increased pain   Treatment Detail/Skilled Intervention PT: Issued and reviewed HEP per AVIS protocol on L x 10 for strength and circulation including AP, QS, GS, HS, heelslides (assist).   Therapeutic Activity   Therapeutic Activities: dynamic activities to improve functional performance Minutes (15115) 15   Symptoms Noted During/After Treatment Fatigue;Increased pain   Treatment Detail/Skilled Intervention PT: Supine in bed upon arrival, agreeable to PT. Educated on precautions following surgery, verbalized understanding. Completes supine<>sit transfer with min-modA to L LE only. Sat EOB  with good tolerance. VSS. Completes sit<>stand transfer with CGA and use of walker. Tolerated ambulating to/from bathroom with SBA using walker. Completed toilet transfer with CGA and cues for L LE placement. Able to complete hygiene cares on own. In bathroom noted to feel slightly dizzy and warm returned to bed, vitals remained stable. Returned to supine and provided cool wash cloth. Ended with needs in reach and nurse notified.   PT Discharge Planning   PT Plan PT: Review HEP, bed mobility with leg , gait into tai. Initiate stairs as able   PT Discharge Recommendation (DC Rec)   (Defer to ortho)   PT Rationale for DC Rec PT: For progression per AVIS protocol and return to PLOF   PT Brief overview of current status PT: Completes bed mobility with min-modA to L LE, CGA for transfers and short distance ambulation with walker   Total Session Time   Timed Code Treatment Minutes 24   Total Session Time (sum of timed and untimed services) 31

## 2024-08-13 NOTE — OR NURSING
"PACU to Inpatient Nursing Handoff    Patient Veronica Herrera is a 58 year old female who speaks English.   Procedure Procedure(s):  ARTHROPLASTY, HIP, TOTAL   Surgeon(s) Primary: Phil Hopkins MD  Fellow - Assisting: Lamin Lemos MD     Allergies   Allergen Reactions    Ciprofloxacin Hives    Ciprofloxacin Rash and Hives       Isolation  [unfilled]     Past Medical History   has a past medical history of Depression, Eczema, Hearing problem, Nasal congestion, Obesity, Prediabetes, Primary osteoarthritis of left hip, PVC's (premature ventricular contractions), Snoring, Sore throat, Tinnitus, and Tobacco use.    Anesthesia General   Dermatome Level     Preop Meds acetaminophen (Tylenol) - time given: 0751  celecoxib (Celebrex) - time given: 0754   Nerve block Not applicable   Intraop Meds dexamethasone (Decadron)  dexmedetomidine (Precedex): 8 mcg total  fentanyl (Sublimaze): 100 mcg total  hydromorphone (Dilaudid): 0.5 mg total  ondansetron (Zofran): last given at 1040   Local Meds Yes - Local Cocktail (morphine, ropivacaine, epinephrine, Toradol)   Antibiotics cefazolin (Ancef) - last given at 0836     Pain Patient Currently in Pain: yes   PACU meds  hydromorphone (Dilaudid): 0.4 mg (total dose) last given at 1135  Dilaudid 0.4mg @ 1239   PCA / epidural No   Capnography     Telemetry ECG Rhythm: Sinus rhythm   Inpatient Telemetry Monitor Ordered? No        Labs Glucose Lab Results   Component Value Date    GLC 98 08/13/2024     08/13/2024       Hgb Lab Results   Component Value Date    HGB 14.1 08/13/2024       INR No results found for: \"INR\"   PACU Imaging Completed     Wound/Incision Incision/Surgical Site Right Ear (Active)   Number of days:        Incision/Surgical Site 08/13/24 Left;Lateral;Posterior Hip (Active)   Incision Assessment UTV 08/13/24 1145   Closure Approximated;Liquid bandage 08/13/24 1044   Incision Drainage Amount None 08/13/24 1145   Dressing Intervention Clean, dry, intact " 08/13/24 1145   Number of days: 0      CMS        Equipment ice pack and abductor pillow   Other LDA       IV Access Peripheral IV 08/13/24 Left;Dorsal Hand (Active)   Site Assessment WDL 08/13/24 1146   Line Status Infusing 08/13/24 1109   Phlebitis Scale 0-->no symptoms 08/13/24 1109   Number of days: 0      Blood Products Not applicable  mL   Intake/Output Date 08/13/24 0700 - 08/14/24 0659   Shift 2944-9046 2992-3383 4700-1142 24 Hour Total   INTAKE   I.V. 850   850   Shift Total(mL/kg) 850(8.58)   850(8.58)   OUTPUT   Blood 175   175   Shift Total(mL/kg) 175(1.77)   175(1.77)   Weight (kg) 99.1 99.1 99.1 99.1      Drains / Macario     Time of void PreOp      PostOp      Diapered? No   Bladder Scan  Bladder scan 88ml @1230   PO    tolerating sips     Vitals    B/P: 133/72  T: 97.7  F (36.5  C)    Temp src: Axillary  P:  Pulse: 68 (08/13/24 1135)          R: 13  O2:  SpO2: 99 %    O2 Device: Simple face mask (08/13/24 1135)    Oxygen Delivery: 6 LPM (08/13/24 1135)         Family/support present significant other and Masha friend   Patient belongings     Patient transported on bed   DC meds/scripts (obs/outpt) Not applicable   Inpatient Pain Meds Released? Yes       Special needs/considerations Calm and friendly, no drains , isolation is very old MRSA, back to 11/2014   Tasks needing completion I M consult ordered       J Carlos Calhoun, RN  KAREN jennings

## 2024-08-13 NOTE — CONSULTS
"[facility]  Consult Note - Hospitalist Service  Date of Admission:  8/13/2024  Consult Requested by: Lamin Lemos MD  Reason for Consult: medical co-management    Assessment & Plan   Veronica Herrera is a 58 year old female admitted on 8/13/2024 following a left AVIS. She has a history of tobacco use, GERD,  depression.     # Primary OA of L hip s/p AVIS 8/13/24  Procedure by Dr. Lemos, no noted complications,  mL.   - post-op activity, diet, pain mgmt, and AC defer to primary orthopedics team.    -WBAT LLE  -Posterior hip precautions  -24hrs ancef post op  -PT for gait training  -ASA, SCDs for post op DVT  -anticipate discharge home tomorrow  - added am CBC  - add PRN senna for constipation    # 1st degree AV block  No known hx. Seen on EKG here. Patient denies any recent or ongoing chest discomfort, dizziness, or lightheadedness.   - rec followup OP    # GERD - cont PTA PRN Tums   # Hx prediabetes - Most recent A1c 7/22/24 was 5.5%.   # Scoliosis  # Depression - no longer on any medications  # Tobacco use - occasional, declines NRT  # Obesity  # Eczema  # Hx MRSA - 2014 positive     The patient's care was discussed with the Bedside Nurse and Patient. POC / recommendations communicated to primary team via this note.     Clinically Significant Risk Factors Present on Admission                          # Obesity: Estimated body mass index is 35.26 kg/m  as calculated from the following:    Height as of this encounter: 1.676 m (5' 6\").    Weight as of this encounter: 99.1 kg (218 lb 7.6 oz).                    Lilly Avilez PA-C  Hospitalist Service  Securely message with [employer] (more info)  Text page via [facility] Paging/Directory   ______________________________________________________________________    Chief Complaint   N/A. S/p hip surgery    History is obtained from the patient    History of Present Illness   Veronica Herrera is a 58 year old female who is " seen in the PACU for a medical evaluation. She is alert, interactive, cooperative. She denies significant pain at present. She states she was nauseous earlier but no longer. Denies other pain or discomfort. In regards to AV block, no recent CP, chest discomfort, palpitations, dizziness, lightheadedness. History and medications reviewed.       Past Medical History    Past Medical History:   Diagnosis Date    Depression     Eczema     Hearing problem     Nasal congestion     Obesity     Prediabetes     Primary osteoarthritis of left hip     PVC's (premature ventricular contractions)     Snoring     Sore throat     Tinnitus     Tobacco use        Past Surgical History   Past Surgical History:   Procedure Laterality Date    ANTERIOR CRUCIATE LIGAMENT REPAIR Right     ARTHROSCOPY KNEE Bilateral     COSMETIC ABDOMINOPLASTY  2019    FISSURECTOMY RECTUM N/A 06/22/2018    Procedure: EXAM UNDER ANESTHESIA WITH EXCISION OF SKIN TAG;  Surgeon: Bessy Carrillo MD;  Location: [facility];  Service:     LIPOSUCTION TRUNK      MAMMOPLASTY REDUCTION Bilateral     OTHER SURGICAL HISTORY Right     Rt hand reconstruction    TYMPANOPLASTY  08/17/2011    Procedure:TYMPANOPLASTY; Posterior Auricular with facial nerve monitoring *Latex Safe* tympanoplasty with canalplasty; Surgeon:DIANNA JOHNSTON; Location:[facility] OR       Medications   I have reviewed this patient's current medications       Review of Systems    The 5 point Review of Systems is negative other than noted in the HPI.     Physical Exam   Vital Signs: Temp: 97.7  F (36.5  C) Temp src: Axillary BP: 133/72 Pulse: 68   Resp: 13 SpO2: 99 % O2 Device: Simple face mask Oxygen Delivery: 6 LPM  Weight: 218 lbs 7.61 oz    GENERAL: adult female seen resting reclined in bed. NAD.   NEURO / PSYCH: Alert, converses appropriately. No focal deficits. Moves all extremities.   HEENT: Anicteric sclera.   CV: RRR. S1, S2. No murmurs appreciated.  2+ left radial pulse.  RESPIRATORY:  Effort normal. Lungs CTAB with no wheezing, rales, rhonchi.   GI: Abdomen soft and non distended with bowel sounds rare. No tenderness or guarding to palpation.   MSK: no gross deformities  SKIN: No jaundice. No rashes or lesions to exposed areas.       Medical Decision Making       40 MINUTES SPENT BY ME on the date of service doing chart review, history, exam, documentation & further activities per the note.      Data     I have personally reviewed the following data over the past 24 hrs:    5.7  \   14.1   / 193     139 104 16.8 /  98   4.2 24 0.83 \

## 2024-08-13 NOTE — BRIEF OP NOTE
[facility]    Brief Operative Note    Pre-operative diagnosis: Primary osteoarthritis of left hip [M16.12]  Post-operative diagnosis Same as pre-operative diagnosis    Procedure: ARTHROPLASTY, HIP, TOTAL, Left - Hip    Surgeon: Surgeons and Role:     * Phil Hopkins MD - Primary     * Lamin Lemos MD - Fellow - Assisting  Anesthesia: General   Estimated Blood Loss: 175    Drains: None  Specimens: * No specimens in log *  Findings:   None.  Complications: None.  Implants:   Implant Name Type Inv. Item Serial No.  Lot No. LRB No. Used Action   IMP SHELL BIOM G7 ACETAB PPS MONTANO HOLE 54MM SZ F [id number] - [id number] Total Joint Component/Insert IMP SHELL BIOM G7 ACETAB PPS MONTANO HOLE 54MM [facility] [id number]  [employer] [id number] Left 1 Implanted   LINER ACTB G7 F 36MM VIVACIT-E HIP STRL LUM LF [id number] - [id number] Total Joint Component/Insert LINER ACTB G7 F 36MM VIVACIT-E HIP STRL LUM LF [id number]  [employer] [id number] Left 1 Implanted   IMP STEM FEM BIOM TAPERLOC STD OFFSET TYPE 1 SZ13 -130 - [id number] Total Joint Component/Insert IMP STEM FEM BIOM TAPERLOC STD OFFSET TYPE 1 SZ13 -130  [employer] [id number] Left 1 Implanted   HEAD FEM 0MM OFST 36MM HIP ACTB MDLR TY 1 BLX D G7 - [id number] Total Joint Component/Insert HEAD FEM 0MM OFST 36MM HIP ACTB MDLR TY 1 BLX D G7  [employer] [id number] Left 1 Implanted       Plan:    -WBAT LLE  -Posterior hip precautions  -admit for observation  -24hrs ancef post op  -PT for gait training  -ASA, SCDs for post op DVT  -anticipate discontinue home tomorrow

## 2024-08-14 ENCOUNTER — APPOINTMENT (OUTPATIENT)
Dept: OCCUPATIONAL THERAPY | Facility: CLINIC | Age: 59
End: 2024-08-14
Attending: ORTHOPAEDIC SURGERY
Payer: COMMERCIAL

## 2024-08-14 ENCOUNTER — APPOINTMENT (OUTPATIENT)
Dept: PHYSICAL THERAPY | Facility: CLINIC | Age: 59
End: 2024-08-14
Attending: ORTHOPAEDIC SURGERY
Payer: COMMERCIAL

## 2024-08-14 VITALS
SYSTOLIC BLOOD PRESSURE: 103 MMHG | BODY MASS INDEX: 35.11 KG/M2 | HEIGHT: 66 IN | TEMPERATURE: 98.6 F | OXYGEN SATURATION: 95 % | DIASTOLIC BLOOD PRESSURE: 73 MMHG | HEART RATE: 68 BPM | RESPIRATION RATE: 16 BRPM | WEIGHT: 218.48 LBS

## 2024-08-14 LAB
ATRIAL RATE - MUSE: 68 BPM
DIASTOLIC BLOOD PRESSURE - MUSE: NORMAL MMHG
ERYTHROCYTE [DISTWIDTH] IN BLOOD BY AUTOMATED COUNT: 11.9 % (ref 10–15)
GLUCOSE BLDC GLUCOMTR-MCNC: 136 MG/DL (ref 70–99)
HCT VFR BLD AUTO: 31.7 % (ref 35–47)
HGB BLD-MCNC: 10.5 G/DL (ref 11.7–15.7)
INTERPRETATION ECG - MUSE: NORMAL
MCH RBC QN AUTO: 30.3 PG (ref 26.5–33)
MCHC RBC AUTO-ENTMCNC: 33.1 G/DL (ref 31.5–36.5)
MCV RBC AUTO: 92 FL (ref 78–100)
P AXIS - MUSE: 29 DEGREES
PLATELET # BLD AUTO: 153 10E3/UL (ref 150–450)
PR INTERVAL - MUSE: 222 MS
QRS DURATION - MUSE: 88 MS
QT - MUSE: 376 MS
QTC - MUSE: 399 MS
R AXIS - MUSE: -16 DEGREES
RBC # BLD AUTO: 3.46 10E6/UL (ref 3.8–5.2)
SYSTOLIC BLOOD PRESSURE - MUSE: NORMAL MMHG
T AXIS - MUSE: 26 DEGREES
VENTRICULAR RATE- MUSE: 68 BPM
WBC # BLD AUTO: 7.4 10E3/UL (ref 4–11)

## 2024-08-14 PROCEDURE — 250N000011 HC RX IP 250 OP 636: Mod: JZ | Performed by: STUDENT IN AN ORGANIZED HEALTH CARE EDUCATION/TRAINING PROGRAM

## 2024-08-14 PROCEDURE — 250N000013 HC RX MED GY IP 250 OP 250 PS 637: Performed by: STUDENT IN AN ORGANIZED HEALTH CARE EDUCATION/TRAINING PROGRAM

## 2024-08-14 PROCEDURE — 97535 SELF CARE MNGMENT TRAINING: CPT | Mod: GO

## 2024-08-14 PROCEDURE — 36415 COLL VENOUS BLD VENIPUNCTURE: CPT | Performed by: PHYSICIAN ASSISTANT

## 2024-08-14 PROCEDURE — 97165 OT EVAL LOW COMPLEX 30 MIN: CPT | Mod: GO

## 2024-08-14 PROCEDURE — 82962 GLUCOSE BLOOD TEST: CPT

## 2024-08-14 PROCEDURE — 85027 COMPLETE CBC AUTOMATED: CPT | Performed by: PHYSICIAN ASSISTANT

## 2024-08-14 PROCEDURE — 99215 OFFICE O/P EST HI 40 MIN: CPT | Performed by: INTERNAL MEDICINE

## 2024-08-14 PROCEDURE — 97116 GAIT TRAINING THERAPY: CPT | Mod: GP

## 2024-08-14 PROCEDURE — 97530 THERAPEUTIC ACTIVITIES: CPT | Mod: GP

## 2024-08-14 RX ORDER — POLYETHYLENE GLYCOL 3350 17 G/17G
17 POWDER, FOR SOLUTION ORAL DAILY
Qty: 510 G | Refills: 0 | Status: SHIPPED | OUTPATIENT
Start: 2024-08-14

## 2024-08-14 RX ORDER — AMOXICILLIN 250 MG
1 CAPSULE ORAL 2 TIMES DAILY
Qty: 60 TABLET | Refills: 0 | Status: SHIPPED | OUTPATIENT
Start: 2024-08-14

## 2024-08-14 RX ORDER — METHOCARBAMOL 750 MG/1
750 TABLET, FILM COATED ORAL EVERY 6 HOURS PRN
Qty: 20 TABLET | Refills: 0 | Status: SHIPPED | OUTPATIENT
Start: 2024-08-14

## 2024-08-14 RX ORDER — OXYCODONE HYDROCHLORIDE 5 MG/1
5-10 TABLET ORAL EVERY 4 HOURS PRN
Qty: 26 TABLET | Refills: 0 | Status: SHIPPED | OUTPATIENT
Start: 2024-08-14

## 2024-08-14 RX ORDER — ASPIRIN 81 MG/1
81 TABLET ORAL 2 TIMES DAILY
Qty: 56 TABLET | Refills: 0 | Status: SHIPPED | OUTPATIENT
Start: 2024-08-14

## 2024-08-14 RX ORDER — ACETAMINOPHEN 325 MG/1
650 TABLET ORAL EVERY 4 HOURS PRN
Qty: 100 TABLET | Refills: 0 | Status: SHIPPED | OUTPATIENT
Start: 2024-08-16

## 2024-08-14 RX ADMIN — GABAPENTIN 100 MG: 100 CAPSULE ORAL at 13:08

## 2024-08-14 RX ADMIN — CEFAZOLIN SODIUM 2 G: 2 INJECTION, SOLUTION INTRAVENOUS at 04:30

## 2024-08-14 RX ADMIN — METHOCARBAMOL 750 MG: 750 TABLET ORAL at 09:16

## 2024-08-14 RX ADMIN — ACETAMINOPHEN 975 MG: 325 TABLET, FILM COATED ORAL at 09:03

## 2024-08-14 RX ADMIN — GABAPENTIN 100 MG: 100 CAPSULE ORAL at 09:04

## 2024-08-14 RX ADMIN — Medication 1 LOZENGE: at 13:09

## 2024-08-14 RX ADMIN — ASPIRIN 81 MG: 81 TABLET, COATED ORAL at 09:04

## 2024-08-14 RX ADMIN — ACETAMINOPHEN 975 MG: 325 TABLET, FILM COATED ORAL at 14:32

## 2024-08-14 RX ADMIN — Medication 1 LOZENGE: at 09:10

## 2024-08-14 RX ADMIN — SENNOSIDES AND DOCUSATE SODIUM 1 TABLET: 50; 8.6 TABLET ORAL at 09:04

## 2024-08-14 RX ADMIN — POLYETHYLENE GLYCOL 3350 17 G: 17 POWDER, FOR SOLUTION ORAL at 09:04

## 2024-08-14 RX ADMIN — ACETAMINOPHEN 975 MG: 325 TABLET, FILM COATED ORAL at 02:00

## 2024-08-14 ASSESSMENT — ACTIVITIES OF DAILY LIVING (ADL)
ADLS_ACUITY_SCORE: 32

## 2024-08-14 NOTE — PROGRESS NOTES
"  Orthopaedic Surgery Daily Progress Note     Subjective: Veronica Herrera is a 58 year old female POD # 1 s/p L primary AVIS  Pain is well controlled. Tolerating diet.   No fever, chills. No chest pain or shortness of breath. No abdominal pain, nausea, vomiting. No diarrhea or constipation. No urinary difficulties.     Physical Exam   /69   Pulse 73   Temp 97.9  F (36.6  C) (Oral)   Resp 17   Ht 1.676 m (5' 6\")   Wt 99.1 kg (218 lb 7.6 oz)   SpO2 97%   BMI 35.26 kg/m      Intake/Output Summary (Last 24 hours) at 8/14/2024 0655  Last data filed at 8/13/2024 1930  Gross per 24 hour   Intake 1050 ml   Output 575 ml   Net 475 ml     General: Alert, well-appearing  in no acute distress.  Respiratory: Non-labored breathing.   Cardiovascular: Extremities warm and well perfused   Extremities: moving all four extremities.   LLE:  -Sens: SILT dp/sp/s/s/t  -Motor: 5/5 EHL/FHL/TA/GSc  -Vasc: 2+ pulses, wwp, brisk cap refill  Dressing CDI    Skin: As noted above. No rashes or lesions appreciated.    Labs    Complete Blood Count   Recent Labs   Lab 08/13/24  0755   WBC 5.7   HGB 14.1        Basic Metabolic Panel  Recent Labs   Lab 08/14/24  0624 08/13/24  0755 08/13/24  0743   NA  --  139  --    POTASSIUM  --  4.2  --    CHLORIDE  --  104  --    CO2  --  24  --    BUN  --  16.8  --    CR  --  0.83  --    * 98 104*     Coagulation Profile  No lab results found in last 7 days.    Assessment/Plan:  Assessment and Plan:  Veronica Herrera is a 58 year old female with s/p Left primary AVIS with Dr. Hopkins      Orthopedic Surgery Primary  Activity: Up with assist until independent. Posterior hip precautions.  Weight bearing status: WBAT.  Pain management: Transition from IV to PO as tolerated.    Antibiotics: Ancef x 24 hours  Diet: diet as tolerated.   DVT prophylaxis: ASA 81 mg BID  Labs: Monitor Hgb   Bracing/Splinting: None.   Dressings: Keep clean, dry and intact   Physical Therapy/Occupational Therapy: " Eval and treat.  Consults: IM, LAM.  Follow-up: Clinic 1 month  Disposition: Anticipate discharge home today, after the patient clears PT

## 2024-08-14 NOTE — PLAN OF CARE
Physical Therapy Discharge Summary    Reason for therapy discharge:    All goals and outcomes met, no further needs identified.    Progress towards therapy goal(s). See goals on Care Plan in [facility] electronic health record for goal details.  Goals met    Therapy recommendation(s):    Defer to ortho

## 2024-08-14 NOTE — PROGRESS NOTES
08/14/24 0922   Appointment Info   Signing Clinician's Name / Credentials (OT) Abdirashid Olivier OTR/L   Quick Adds   Quick Adds Certification   Living Environment   People in Home spouse   Current Living Arrangements house   Home Accessibility stairs to enter home;stairs within home   Number of Stairs, Main Entrance 2   Stair Railings, Main Entrance railings safe and in good condition;railing on left side (ascending)   Number of Stairs, Within Home, Primary greater than 10 stairs   Stair Railings, Within Home, Primary railings safe and in good condition   Transportation Anticipated family or friend will provide   Living Environment Comments Pt can stay on main level.   Self-Care   Usual Activity Tolerance good   Current Activity Tolerance fair   Regular Exercise No   Equipment Currently Used at Home none   Fall history within last six months no   Activity/Exercise/Self-Care Comment Independent at baseline   General Information   Onset of Illness/Injury or Date of Surgery 08/13/24   Referring Physician Lamin Lemos MD   Patient/Family Therapy Goal Statement (OT) To go home   Additional Occupational Profile Info/Pertinent History of Current Problem POD #1 L AVIS   Existing Precautions/Restrictions fall;no hip IR;no hip ADD past midline;90 degree hip flexion   Cognitive Status Examination   Cognitive Status Comments WNL   Pain Assessment   Patient Currently in Pain Yes, see Vital Sign flowsheet   Range of Motion Comprehensive   Comment, General Range of Motion deficits 2/2 post surgical precautions   Strength Comprehensive (MMT)   Comment, General Manual Muscle Testing (MMT) Assessment deficits 2/2 pain and surgical procedure   Bed Mobility   Bed Mobility scooting/bridging;supine-sit   Scooting/Bridging Parsons (Bed Mobility) supervision   Supine-Sit Parsons (Bed Mobility) supervision   Activities of Daily Living   BADL Assessment/Intervention lower body dressing;toileting   Lower Body Dressing  Assessment/Training   [place] Level (Lower Body Dressing) minimum assist (75% patient effort)   Toileting   [place] Level (Toileting) supervision   Clinical Impression   Criteria for Skilled Therapeutic Interventions Met (OT) Yes, treatment indicated   OT Problem List-Impairments impacting ADL problems related to;activity tolerance impaired;mobility;range of motion (ROM);strength;pain;post-surgical precautions   Assessment of Occupational Performance 1-3 Performance Deficits   Identified Performance Deficits dressing, bathing, functional mobility   Planned Therapy Interventions (OT) ADL retraining;transfer training   Clinical Decision Making Complexity (OT) problem focused assessment/low complexity   Risk & Benefits of therapy have been explained evaluation/treatment results reviewed;care plan/treatment goals reviewed;risks/benefits reviewed;current/potential barriers reviewed;participants voiced agreement with care plan;participants included;patient   OT Total Evaluation Time   OT Eval, Low Complexity Minutes (57478) 8   Therapy Certification   Medical Diagnosis L AVIS   Start of Care Date 08/14/24   Certification date from 08/14/24   Certification date to 08/14/24   OT Goals   Therapy Frequency (OT) One time eval and treatment   OT Predicted Duration/Target Date for Goal Attainment 08/14/24   OT Goals Upper Body Bathing;Toilet Transfer/Toileting;OT Goal 1;Lower Body Dressing   OT: Lower Body Dressing Supervision/stand-by assist;using adaptive equipment;Goal Met;within precautions   OT: Upper Body Bathing Supervision/stand-by assist;using adaptive equipment;Goal Met;within precautions   OT: Toilet Transfer/Toileting Supervision/stand-by assist;toilet transfer;cleaning and garment management;within precautions;Goal Met   OT: Goal 1 The patient will participate in education regarding DME for safe and independent bathing and tub transfers and verbalize understanding as needed for safe d/c plan. Goal met.    Self-Care/Home Management   Self-Care/Home Mgmt/ADL, Compensatory, Meal Prep Minutes (38810) 10   Symptoms Noted During/After Treatment (Meal Preparation/Planning Training) increased pain   Treatment Detail/Skilled Intervention Pt ambulating to bathroom w/ nusing at encounter, handoff pt care to OT for ADL training. Demonstrates ability to transfer and toilet in bathroom w/ supervision assist and minimal VCs for use of FWW. sitting back to EOB SBA for LB dressing training. Edu on hip precautions and impact on I/ADL independence. Edu on use of reacher for LB dressing. Pt then progressing to SBA for LB dressing, donning pants, w/ cues for technique and use of reacher. provided education on tub shower transfer options w/ recommendation for use of extended tub bench.   OT Discharge Planning   OT Rationale for DC Rec Defer to Ortho - The patient demonstrates skills needed to completed ADL with supervision - minimal assist at discharge and will have assist as needed for all I/ADL. Anticipate safe discharge home with assist as above and DME of tub bench, RTS, and reacher.   OT Brief overview of current status SBA w/ FWW      [facility]  OUTPATIENT OCCUPATIONAL THERAPY  EVALUATION  PLAN OF TREATMENT FOR OUTPATIENT REHABILITATION  (COMPLETE FOR INITIAL CLAIMS ONLY)  Patient's Last Name, First Name, M.I.  YOB: 1965  Veronica Herrera                          Provider's Name  [facility] Medical Record No.  [id number]                             Onset Date:  08/13/24   Start of Care Date:  (P) 08/14/24   Type:     ___PT   _X_OT   ___SLP Medical Diagnosis:  (P) L AVIS                    OT Diagnosis:    Visits from SOC:  1     See note for plan of treatment, functional goals and certification details    I CERTIFY THE NEED FOR THESE SERVICES FURNISHED UNDER        THIS PLAN OF TREATMENT AND WHILE UNDER MY CARE     (Physician co-signature of this  document indicates review and certification of the therapy plan).

## 2024-08-14 NOTE — PLAN OF CARE
Goal Outcome Evaluation:         VS: Temp: 98.6  F (37  C) Temp src: Oral BP: 103/73 Pulse: 68   Resp: 16 SpO2: 95 % O2 Device: None (Room air) Oxygen Delivery: 1 LPM   O2: On RA, sating high 90s. Denies SOB.   Output: Voiding spontaneously without difficulty. Up to use bathroom    Last BM: 8/13 Continent of bowel    Activity: Assist x1 with walker and GB    Skin: L hip surgical incision   Otherwise skin intact    Pain: Rates 2 out of 10, managed with tylenol, PRN robaxin, and ice packs.    CMS: Alert and oriented x4.    Dressing: L hip dressing DCI    Diet: Regular, good appetite.    LDA: L and R PIV removed    Equipment: GB, walker    Plan: Discharging today back home    Additional Info: Discharge instructions reviewed with pt and SO in room, medications reviewed, pt expressed understanding of instructions.

## 2024-08-14 NOTE — OP NOTE
Operative Note    Date 8/13/2024     Pre-operative diagnosis:         Primary osteoarthritis of left hip [M16.12]  Post-operative diagnosis        Same as pre-operative diagnosis     Procedure:      ARTHROPLASTY, HIP, TOTAL, Left - Hip     Surgeon:         Surgeons and Role:     * Phil Hopkins MD - Primary     * Lamin Lemos MD - Fellow - Assisting  Anesthesia:     General             Estimated Blood Loss: 175     Drains: None  Specimens:     * No specimens in log *  Findings:                     None.  Complications:            None.  Implants:           Implant Name Type Inv. Item Serial No.  Lot No. LRB No. Used Action   IMP SHELL BIOM G7 ACETAB PPS MONTANO HOLE 54MM SZ F [id number] - [id number] Total Joint Component/Insert IMP SHELL BIOM G7 ACETAB PPS MONTANO HOLE 54MM SZ F [id number]   [employer] [id number] Left 1 Implanted   LINER ACTB G7 F 36MM VIVACIT-E HIP STRL LUM LF [id number] - [id number] Total Joint Component/Insert LINER ACTB G7 F 36MM VIVACIT-E HIP STRL LUM LF [id number]   [employer] [id number] Left 1 Implanted   IMP STEM FEM BIOM TAPERLOC STD OFFSET TYPE 1 SZ13 -130 - [id number] Total Joint Component/Insert IMP STEM FEM BIOM TAPERLOC STD OFFSET TYPE 1 SZ13 -130   [employer] [id number] Left 1 Implanted   HEAD FEM 0MM OFST 36MM HIP ACTB MDLR TY 1 BLX D G7 - [id number] Total Joint Component/Insert HEAD FEM 0MM OFST 36MM HIP ACTB MDLR TY 1 BLX D G7   [employer] [id number] Left 1 Implanted      DESCRIPTION OF PROCEDURE: The patient was placed on the operating table in the lateral decubitus position after induction of general anesthesia. With the operated hip turned up, standard prep and drape was performed. A minimal incision posterior lateral approach to the hip joint was then performed making a curvilinear incision over the greater trochanter and taking this down through the subcutaneous tissue. The gluteus yaya was then split in line with its fibers. After internal  rotation of the femur, the piriformis and obturator internus muscles were detached and the soft tissue capsule was divided from the posterior aspect of the femoral neck. The tendons were tagged with a suture. 2-0 silk sutures were used to control the circumflex vessels. At this point, the capsule was incised superiorly and inferiorly as a rhomboid shaped trap-door. The hip joint was then dislocated. A femoral neck osteotomy was performed at the mid portion of the femoral neck. Head was removed. The femur was then displaced anteriorly and the acetabulum was exposed after additional capsular release as needed.   Large osteophytes were excised as needed. The acetabulum was now reamed by medializing to within 2 mm of the medial wall. Sequentially enlarging reaming was performed to a 1 mm undersize relative to the cup size implanted. The above mentioned size cup was selected as the optimal implant. It was impacted into a 45-degree abducted and 20-degree anteverted position. Fixation screws, 6.5 mm diameter were placed as needed for stability.  A trial liner component was placed.   Attention was now directed to the femur where it was exposed. Sequential reaming and broaching were performed to allow placement of the above mentioned diameter component. After trialing and using the broach, the hip was found to be secure and stable in full extension and external rotation of > 45 degrees as well as flexion of 90 degrees combined with internal rotation > 45 degrees.  Leg lengths were judged to be within 5 mm of symmetry.   At this time, the acetabulum was re-exposed and a liner insert locked into position with the apex of the elevated lip placed at 9 o'clock position as needed. After securing the stability of the implant, attention was directed to the femur where the above-mentioned size was inserted in antegrade fashion. There is no evidence of any fracture of the femur during insertion. Again, trial reduction was performed  and the above mentioned size neck length was selected. The range of motion was confirmed again and the hip was stable. The real head was impacted on the Gunn taper junction after it had been cleansed and dried.  A thorough irrigation of the wound was now performed. Wound closure was accomplished by reapproximating the posterior capsular soft tissues as well as obturator internus and the piriformis muscles. The remainder of the wound was closed in layers with absorbable sutures using standard technique. Sterile dressing was applied.       Plan:     -WBAT LLE  -Posterior hip precautions  -admit for observation  -24hrs ancef post op  -PT for gait training  -ASA, SCDs for post op DVT  -anticipate discontinue home tomorrow      MD Shanna Haq Family Professor  Oncology and Adult Reconstructive Surgery  Dept Orthopaedic Surgery, [facility]  [phone] office, [phone] pager  [email or web address]

## 2024-08-14 NOTE — ANESTHESIA POSTPROCEDURE EVALUATION
Patient: Veronica Herrera    Procedure: Procedure(s):  ARTHROPLASTY, HIP, TOTAL       Anesthesia Type:  General    Note:  Disposition: Inpatient   Postop Pain Control: Uneventful            Sign Out: Well controlled pain   PONV: No   Neuro/Psych: Uneventful            Sign Out: Acceptable/Baseline neuro status   Airway/Respiratory: Uneventful            Sign Out: Acceptable/Baseline resp. status   CV/Hemodynamics: Uneventful            Sign Out: Acceptable CV status; No obvious hypovolemia; No obvious fluid overload   Other NRE: NONE   DID A NON-ROUTINE EVENT OCCUR? No           Last vitals:  Vitals Value Taken Time   BP 87/71 08/13/24 1330   Temp 36.6  C (97.9  F) 08/13/24 1315   Pulse 76 08/13/24 1339   Resp 17 08/13/24 1339   SpO2 98 % 08/13/24 1339   Vitals shown include unfiled device data.    Electronically Signed By: Desean Gutierrez DO  August 14, 2024  1:45 PM

## 2024-08-14 NOTE — PLAN OF CARE
Goal Outcome Evaluation:                        VS: VSS and afebrile    O2: Wean O2 to RA  Educated IS, able to perform appropriately    Output: Voids without difficulty  PVR: 0   Last BM: 8/13 PTA   Activity: WBAT- up with A1-GB and walker    Skin: CDI except l hip incision    Pain: Given Oxy PRN 1x   Neuro: Alert and orientedx4  Denies numbness and tingling in all extremities   Dressing: CDI    Diet: Regular    LDA: PIV: L hand- SL, R hand : infusing    Equipment: Cellphone and personal belongings at bedside    Plan: Possible discharge 8/14   Additional Info:

## 2024-08-14 NOTE — PROGRESS NOTES
Per chart review, no home care needs indicated at this time. Anticipate discharge to home today with family assist. RNCC available as needed.    Pattie Michel RN, BSN  Care Coordinator, 5 Ortho  Phone [phone]  Pager [phone]

## 2024-08-14 NOTE — PROGRESS NOTES
"[facility]    Medicine Progress Note - Hospitalist Service, GOLD TEAM 16    Date of Admission:  8/13/2024    Assessment & Plan   Veronica Herrera is a 58 year old female admitted on 8/13/2024 following a left AVIS. She has a history of tobacco use, GERD,  depression.      # Primary OA of L hip s/p AVIS 8/13/24  # acute post-op anemia   Procedure by Dr. Lemos, no noted complications,  mL.   Post-op hgb 10.5  - post-op activity, diet, pain mgmt, and AC defer to primary orthopedics team.               -WBAT LLE  -Posterior hip precautions  -24hrs ancef post op completed  -PT for gait training  -ASA, SCDs for post op DVT  - add PRN senna for constipation    # 1st degree AV block - normal HR on EKG   No known hx. Seen on EKG here. Patient denies any recent or ongoing chest discomfort, dizziness, or lightheadedness.   - rec followup OP     # GERD - cont PTA PRN Tums   # Hx prediabetes - Most recent A1c 7/22/24 was 5.5%.   # Scoliosis  # Depression - no longer on any medications  # Tobacco use - occasional, declines NRT  # Obesity  # Eczema  # Hx MRSA - 2014 positive          Diet: Advance Diet as Tolerated: Regular Diet Adult  Discharge Instruction - Regular Diet Adult    DVT Prophylaxis: Defer to primary service  Macario Catheter: Not present  Lines: None     Cardiac Monitoring: None  Code Status: Full Code      Clinically Significant Risk Factors Present on Admission                    # Circulatory Shock: required vasopressors within past 24 hours     # Anemia: based on hgb <11       # Obesity: Estimated body mass index is 35.26 kg/m  as calculated from the following:    Height as of this encounter: 1.676 m (5' 6\").    Weight as of this encounter: 99.1 kg (218 lb 7.6 oz).                    Disposition Plan     Medically Ready for Discharge: Anticipated Today from medicine standpoint.             ISABELA SUN MD  Hospitalist Service, GOLD TEAM 16  [facility]" [facility]  Securely message with [facility] (more info)  Text page via [employer] Paging/Directory   See signed in provider for up to date coverage information  ______________________________________________________________________    Interval History   - afebrile and HDS  - on 1 L O2 overnight   - pain well controlled   -Worked well with therapy today.    Physical Exam   Vital Signs: Temp: 98.6  F (37  C) Temp src: Oral BP: 116/69 Pulse: 68   Resp: 16 SpO2: 97 % O2 Device: None (Room air) Oxygen Delivery: 1 LPM  Weight: 218 lbs 7.61 oz    General Appearance: Lyingcomfortably in bed, on room air, in no acute distress or discomfort.  HEENT: PERRL: EOMI; moist mucous membrane w/o lesions  Neck: No JVD  Pulmonary: Clear to auscultation bilaterally, no wheezes or crackles  CVS: Regular rhythm, no murmurs, rubs or gallops  GI: BS (+), soft nontender, no rebound or guarding   Extremities: No LE edema, moves all extremities spontaneously.  Skin: No rashes or lesions  Neurologic: A&O x3      Medical Decision Making       45 MINUTES SPENT BY ME on the date of service doing chart review, history, exam, documentation & further activities per the note.      Data   ------------------------- PAST 24 HR DATA REVIEWED -----------------------------------------------    I have personally reviewed the following data over the past 24 hrs:    7.4  \   10.5 (L)   / 153     N/A N/A N/A /  136 (H)   N/A N/A N/A \       Imaging results reviewed over the past 24 hrs:   Recent Results (from the past 24 hour(s))   XR Pelvis w Hip Port Left G/E 2 Views    Narrative    EXAM: XR PELVIS AND HIP PORTABLE LEFT 2 VIEWS  LOCATION: [facility]  DATE: 8/13/2024    INDICATION: Post op from left total hip.  COMPARISON: None.      Impression    IMPRESSION:   1. Degenerative changes in the spine.  2. Mild osteoarthrosis of the SI joints.  3. Recent left total hip arthroplasty with  adjacent gas. Excellent position and alignment of components. There is no evidence of complication or periprosthetic fracture.

## 2024-08-15 NOTE — DISCHARGE SUMMARY
ORTHOPEDIC SURGERY DISCHARGE SUMMARY     Date of Admission: 8/13/2024  Date of Discharge: 8/14/2024  3:09 PM  Disposition: Home  Staff Physician: No att. providers found  Primary Care Provider: Sarah Owens    DISCHARGE DIAGNOSIS:  Primary osteoarthritis of left hip [M16.12]    PROCEDURES: Procedure(s):  ARTHROPLASTY, HIP, TOTAL  on 8/13/2024    BRIEF HISTORY:  This is a 58 year old patient who has been followed in clinic. Please refer to that documentation for full details. Briefly, the patient has Hip OA. The patient has failed conservative treatment of symptoms and desires more definitive intervention. Therefore, after reviewing non-operative and operative options including the risks and benefits associated with each, the patient elected to proceed with the above stated procedure.     HOSPITAL COURSE:    The patient was admitted following the above listed procedures for pain control and rehabilitation. Veronica Herrera did well post-operatively. Medicine was consulted post operatively to aid in management of medical co-morbidities. The patient received routine nursing cares and at the time of discharge was medically stable. Vital signs were stable throughout admission. The patient is tolerating a regular diet and is voiding spontaneously. All PT/OT goals have been met for safe mobility. Pain is now controlled on oral medications which will be available on discharge. Stool softeners have been used while taking pain medications to help prevent constipation. Veronica Herrera is deemed medically safe to discharge.     Antibiotics:  Ancef given periop and 24 hours postop.  DVT prophylaxis:  ASA initiated after surgery and will be continued for 4 weeks.  PT Progress:  Has met PT/OT goals for safe mobility.   Pain Meds:  Weaned off all IV pain meds by discharge.  Inpatient Events:  No significant postoperative events or complications.     PHYSICAL EXAM:    /69   Pulse 73   Temp 97.9  F (36.6  C) (Oral)    "Resp 17   Ht 1.676 m (5' 6\")   Wt 99.1 kg (218 lb 7.6 oz)   SpO2 97%   BMI 35.26 kg/m       Intake/Output Summary (Last 24 hours) at 8/14/2024 0655  Last data filed at 8/13/2024 1930      Gross per 24 hour   Intake 1050 ml   Output 575 ml   Net 475 ml      General: Alert, well-appearing  in no acute distress.  Respiratory: Non-labored breathing.   Cardiovascular: Extremities warm and well perfused   Extremities: moving all four extremities.   LLE:  -Sens: SILT dp/sp/s/s/t  -Motor: 5/5 EHL/FHL/TA/GSc  -Vasc: 2+ pulses, wwp, brisk cap refill  Dressing CDI     Skin: As noted above. No rashes or lesions appreciated.    FOLLOWUP:        Future Appointments   Date Time Provider Department Center   9/11/2024  3:20 PM Tracey Zheng PA-C [facility]       Orthopedic Surgery appointments are at the [facility] [place] ([address] [place] [address]). Call [phone] to schedule a follow-up appointment at this location with your provider.     PLANNED DISCHARGE ORDERS:      Discharge Medication List as of 8/14/2024  1:58 PM        START taking these medications    Details   acetaminophen (TYLENOL) 325 MG tablet Take 2 tablets (650 mg) by mouth every 4 hours as needed for other (For optimal non-opioid multimodal pain management to improve pain control.), Disp-100 tablet, R-0, E-Prescribe      aspirin 81 MG EC tablet Take 1 tablet (81 mg) by mouth 2 times daily, Disp-56 tablet, R-0, E-Prescribe      methocarbamol (ROBAXIN) 750 MG tablet Take 1 tablet (750 mg) by mouth every 6 hours as needed for muscle spasms, Disp-20 tablet, R-0, E-Prescribe      oxyCODONE (ROXICODONE) 5 MG tablet Take 1-2 tablets (5-10 mg) by mouth every 4 hours as needed for moderate pain, Disp-26 tablet, R-0, E-Prescribe      polyethylene glycol (MIRALAX) 17 GM/Dose powder Take 17 g by mouth daily, Disp-510 g, R-0, E-Prescribe      senna-docusate (SENOKOT-S/PERICOLACE) 8.6-50 MG tablet Take 1 tablet by mouth 2 " "times daily, Disp-60 tablet, R-0, E-Prescribe           CONTINUE these medications which have NOT CHANGED    Details   calcium carbonate (TUMS) 500 MG chewable tablet Take 1 chew tab by mouth as needed for heartburn, Historical      Multiple Vitamins-Minerals (MULTIVITAL PO) Take 1 tablet by mouth every morning, Historical      NALTREXONE HCL PO Historical      Omega-3 Fatty Acids (OMEGA-3 FISH OIL PO) Historical      progesterone (PROMETRIUM) 100 MG capsule Take 100 mg by mouth daily, Historical      TURMERIC PO Historical           STOP taking these medications       ibuprofen (ADVIL/MOTRIN) 200 MG tablet Comments:   Reason for Stopping:                 Discharge Procedure Orders   Reason for your hospital stay   Order Comments: Discharge home     When to call - Contact Surgeon Team   Order Comments: You may experience symptoms that require follow-up before your scheduled appointment. Refer to the \"[employer] Tool\" for instructions on when to contact your Surgeon Team if you are concerned about pain control, blood clots, constipation, or if you are unable to urinate.     When to call - Reach out to Urgent Care   Order Comments: If you are not able to reach your Surgeon Team and you need immediate care, go to the Orthopedic Walk-in Clinic or Urgent Care at your Surgeon's office.  Do NOT go to the Emergency Room unless you have shortness of breath, chest pain, or other signs of a medical emergency.     When to call - Reasons to Call 911   Order Comments: Call 911 immediately if you experience sudden-onset chest pain, arm weakness/numbness, slurred speech, or shortness of breath     Discharge Instruction - Breathing exercises   Order Comments: Perform breathing exercises 10 times per hours while awake for 2 weeks. (If given, use your Incentive Spirometer)     Symptoms - Fever Management   Order Comments: A low grade fever can be expected after surgery.  Use acetaminophen (TYLENOL) as needed for fever management.  " Contact your Surgeon Team if you have a fever greater than 101.5 F, chills, and/or night sweats.     Symptoms - Constipation management   Order Comments: Constipation (hard, dry bowel movements) is expected after surgery due to the combination of being less active, the anesthetic, and the opioid pain medication.  You can do the following to help reduce constipation:  ~  FLUIDS:  Drink clear liquids (water or Gatorade), or juice (apple/prune).  ~  DIET:  Eat a fiber rich diet.    ~  ACTIVITY:  Get up and move around several times a day.  Increase your activity as you are able.  MEDICATIONS:  Reduce the risk of constipation by starting medications before you are constipated.  You can take Miralax   (1 packet as directed) and/or a stool softener (Senokot 1-2 tablets 1-2 times a day).  If you already have constipation and these medications are not working, you can get magnesium citrate and use as directed.  If you continue to have constipation you can try an over the counter suppository or enema.  Call your Surgeon Team if it has been greater than 3 days since your last bowel movement.     Symptoms - Reduced Urine Output   Order Comments: Changes in the amount of fluids you drank before and after surgery may result in problems urinating.  It is important to stay well-hydrated after surgery and drink plenty of water. If it has been greater than 8 hours since you have urinated despite drinking plenty of water, call your Surgeon Team.     Comfort and Pain Management - Pain after Surgery   Order Comments: Pain after surgery is normal and expected.  You will have some amount of pain for several weeks after surgery.  Your pain will improve with time.  There are several things you can do to help reduce your pain including: rest, ice, elevation, and using pain medications as needed. Contact your Surgeon Team if you have pain that persists or worsens after surgery despite rest, ice, elevation, and taking your medication(s) as  prescribed. Contact your Surgeon Team if you have new numbness, tingling, or weakness in your operative extremity.     Comfort and Pain Management - Swelling after Surgery   Order Comments: Swelling and/or bruising of the surgical extremity is common and may persist for several months after surgery. In addition to frequent icing and elevation, gentle compressive support with an ACE wrap or tubigrip may help with swelling. Apply compression regularly, removing at least twice daily to perform skin checks. Contact your Surgeon Team if your swelling increases and is NOT associated with an increase in your activity level, or if your swelling increases and is associated with redness and pain.     Comfort and Pain Management - Cold therapy   Order Comments: Ice can be used to control swelling and discomfort after surgery. Place a thin towel over your operative site and apply the ice pack overtop. Leave ice pack in place for 20 minutes, then remove for 20 minutes. Repeat this 20 minutes on/20 minutes off routine as often as tolerated.     Medication Instructions - Acetaminophen (TYLENOL) Instructions   Order Comments: You were discharged with acetaminophen (TYLENOL) for pain management after surgery. Acetaminophen most effectively manages pain symptoms when it is taken on a schedule without missing doses (every four, six, or eight hours). Your Provider will prescribe a safe daily dose between 3000 - 4000 mg.  Do NOT exceed this daily dose. Most patients use acetaminophen for pain control for the first four weeks after surgery.  You can wean from this medication as your pain decreases.     Medication Instructions - NSAID Instructions   Order Comments: You were discharged with an anti-inflammatory medication for pain management to use in combination with acetaminophen (TYLENOL) and the narcotic pain medication.  Take this medication exactly as directed.  You should only take one anti-inflammatory at a time.  Some common  "anti-inflammatories include: ibuprofen (ADVIL, MOTRIN), naproxen (ALEVE, NAPROSYN), celecoxib (CELEBREX), meloxicam (MOBIC), ketorolac (TORADOL).  Take this medication with food and water.     Follow Up Care   Order Comments: Follow-up with your Surgeon Team in 1 month for wound check.     Medication instructions -  Anticoagulation - aspirin   Order Comments: Take the aspirin as prescribed for a total of four weeks after surgery.  This is given to help minimize your risk of blood clot.     Activity - Exercises to prevent blood clots   Order Comments: Unless otherwise directed by your Surgeon team, perform the following exercises at least three times per day for the first four weeks after surgery to prevent blood clots in your legs: 1) Point and flex your feet (Ankle Pumps), 2) Move your ankle around in big circles, 3) Wiggle your toes, 4) Walk, even for short distances, several times a day, will help decrease the risk of blood clots.     Order Specific Question Answer Comments   Is discharge order? Yes      Comfort and Pain Management - LOWER Extremity Elevation   Order Comments: Swelling is expected for several months after surgery. This type of swelling is usually associated with gravity and activity, and can be improved with elevation.   The best way to do this is to get your \"toes above your nose\" by laying down and placing several pillows lengthwise under your calf and heel. When elevating your leg keep your knee completely straight. Perform this elevation as often as possible especially for the first two weeks after surgery.     Activity - Total Hip Arthroplasty   Order Comments: Refer to the [facility] \"Your Guide to Total Joint Replacement\" for recommendations on activities and Exercises.     Order Specific Question Answer Comments   Is discharge order? Yes      Return to Driving   Order Comments: Return to driving - Driving is NOT permitted until directed by your provider. Under no circumstance are " you permitted to drive while using narcotic pain medications.     Order Specific Question Answer Comments   Is discharge order? Yes      Dressing / Wound Care - Wound   Order Comments: You have a clean dressing on your surgical wound. Dressing change instructions as follows: remove your dressing in 7 days, and leave incision open to air. Contact your Surgeon Team if you have increased redness, warmth around the surgical wound, and/or drainage from the surgical wound.     Dressing / Wound Care - NO Tub Bathing   Order Comments: Tub bathing, swimming, or any other activities that will cause your incision to be submerged in water should be avoided until allowed by your Surgeon.     No flexion past 90 degrees   Order Comments: No bending at waist past 90 degrees.     Order Specific Question Answer Comments   Is discharge order? Yes      No internal rotation   Order Comments: No pivoting on your operative leg.     Order Specific Question Answer Comments   Is discharge order? Yes      No adduction past midline   Order Comments: No crossing your operative leg.     Order Specific Question Answer Comments   Is discharge order? Yes      Weight bearing as tolerated   Order Comments: Weight bearing as tolerated on your operative extremity.     Order Specific Question Answer Comments   Is discharge order? Yes      Dressing Wound Care - Shower with wound/dressing NOT covered   Order Comments: You do not need to cover your dressing or incision in the shower, you may allow water and soap to run over top of the surgical dressing or incision. You may shower 3 days after surgery.  You are strictly prohibited from soaking or submerging the surgical wound underwater.     Crutches DME   Order Comments: DME Documentation: Describe the reason for need to support medical necessity: Impaired gait status post hip surgery. I, the undersigned, certify that the above prescribed supplies are medically necessary for this patient and is both  reasonable and necessary in reference to accepted standards of medical practice in the treatment of this patient's condition and is not prescribed as a convenience.     Order Specific Question Answer Comments   Medical Equipment (DME) Supplier: [employer]    PATIENT INSTRUCTIONS: If you did not receive this ordered item today, please contact [employer] for availability ([facility] Locations: [phone], [place]: [phone]).    Crutch Type: Standard    Crutches Add On: NA    Length of Need: Lifetime      Cane DME   Order Comments: DME Documentation: Describe the reason for need to support medical necessity: Impaired gait status post hip surgery. I, the undersigned, certify that the above prescribed supplies are medically necessary for this patient and is both reasonable and necessary in reference to accepted standards of medical practice in the treatment of this patient's condition and is not prescribed as a convenience.     Order Specific Question Answer Comments   Medical Equipment (DME) Supplier: [employer]    PATIENT INSTRUCTIONS: If you did not receive this ordered item today, please contact [employer] for availability ([facility] Locations: [phone], [employer]: [phone]).    Cane Type: Single Tip    Reminder: Patient can typically get 1 every 5 years      Walker DME   Order Comments: DME Documentation: Describe the reason for need to support medical necessity: Impaired gait status post hip surgery. I, the undersigned, certify that the above prescribed supplies are medically necessary for this patient and is both reasonable and necessary in reference to accepted standards of medical practice in the treatment of this patient's condition and is not prescribed as a convenience.     Order Specific Question Answer Comments   Medical Equipment (DME) Supplier: [employer]    PATIENT INSTRUCTIONS: If you did not  receive this ordered item today, please contact [facility] for availability ([facility] Locations: [phone], [place]: [phone]).    Walker Type: Standard (2 Wheel)    Accessories: N/A      Discharge Instruction - Regular Diet Adult   Order Comments: Return to your pre-surgery diet unless instructed otherwise     Order Specific Question Answer Comments   Is discharge order? Yes        Orthopedic surgery staff for this patient is Dr. Hopknis. Discussed.        --  Javon Nolan MD  Orthopedic Surgery PGY-4

## 2024-08-29 DIAGNOSIS — Z96.642 STATUS POST TOTAL REPLACEMENT OF LEFT HIP: Primary | ICD-10-CM

## 2024-09-09 ASSESSMENT — HOOS JR
LYING IN BED (TURNING OVER, MAINTAINING HIP POSITION): MILD
WALKING ON UNEVEN SURFACE: MILD
SITTING: MODERATE
HOOS JR TOTAL INTERVAL SCORE: 67.52
BENDING TO THE FLOOR TO PICK UP OBJECT: MILD
RISING FROM SITTING: MILD
GOING UP OR DOWN STAIRS: MILD

## 2024-09-11 ENCOUNTER — ANCILLARY PROCEDURE (OUTPATIENT)
Dept: GENERAL RADIOLOGY | Facility: CLINIC | Age: 59
End: 2024-09-11
Attending: PHYSICIAN ASSISTANT
Payer: COMMERCIAL

## 2024-09-11 ENCOUNTER — OFFICE VISIT (OUTPATIENT)
Dept: ORTHOPEDICS | Facility: CLINIC | Age: 59
End: 2024-09-11
Payer: COMMERCIAL

## 2024-09-11 DIAGNOSIS — Z96.642 STATUS POST TOTAL REPLACEMENT OF LEFT HIP: ICD-10-CM

## 2024-09-11 DIAGNOSIS — Z96.642 STATUS POST TOTAL REPLACEMENT OF LEFT HIP: Primary | ICD-10-CM

## 2024-09-11 PROCEDURE — 73502 X-RAY EXAM HIP UNI 2-3 VIEWS: CPT | Mod: LT | Performed by: RADIOLOGY

## 2024-09-11 PROCEDURE — 99024 POSTOP FOLLOW-UP VISIT: CPT | Performed by: PHYSICIAN ASSISTANT

## 2024-09-11 NOTE — LETTER
9/11/2024      Veronica Herrera  [address] [address]      Dear Colleague,    Thank you for referring your patient, Veronica Herrera, to the [facility] [place]. Please see a copy of my visit note below.    Chief Complaint: wound check  Date 8/13/2024  Pre-operative diagnosis:         Primary osteoarthritis of left hip [M16.12]  Post-operative diagnosis        Same as pre-operative diagnosis  Procedure:      ARTHROPLASTY, HIP, TOTAL, Left - Hip  Surgeon:         Surgeons and Role:     * Phil Hopkins MD - Primary    HPI: Veronica is a 58 year old female here 1 month s/p left total hip arthroplasty. Patient reports overall she is doing well.  She never had to take the oxycodone.  She reports she is back to working from home and trying to find a comfortable chair.  She is sleeping okay.  She takes Tylenol occasionally.  She does not need anything for gait assistance around the house but does use a cane when out and about.  She is inquiring about physical therapy.  No other concerns.    Physical Exam: 58 year old female who is alert and oriented and in no distress.  She has a mildly antalgic gait with single-point cane today.  Her left hip incision is well-healed with no erythema or drainage.  Mild swelling.  No calf tenderness.  She is neurovascular intact distally.  No pain with passive rotation of the hip.    Imaging: AP pelvis and lateral of the xrays of the left hip were ordered and obtained. These show: Left total hip arthroplasty in excellent alignment with no sign of fracture or lucency.      Impression: 58 year old female doing well 1 month s/p left total hip arthroplasty    Plan: Okay to soak the hip at this point.  Use vitamin E oil or cocoa butter on the wound as needed.  Continue to follow him precautions as I reminded her of.  Continue to progress activities.  I think she would benefit from physical therapy to it improve her gait and strength.  I placed an  order and they will call to schedule.  As long as she continues to do well, she will follow-up at 1 year postop for an AP and lateral x-ray of the left hip.  Call with any concerns.  Notify us of any dental work over the next year so that we can provide prophylactic antibiotics.  She understands and agrees with plan.  All questions answered.      Again, thank you for allowing me to participate in the care of your patient.        Sincerely,        Tracey Zheng PA-C

## 2024-09-11 NOTE — PROGRESS NOTES
Chief Complaint: wound check  Date 8/13/2024  Pre-operative diagnosis:         Primary osteoarthritis of left hip [M16.12]  Post-operative diagnosis        Same as pre-operative diagnosis  Procedure:      ARTHROPLASTY, HIP, TOTAL, Left - Hip  Surgeon:         Surgeons and Role:     * Phil Hopkins MD - Primary    HPI: Veronica is a 58 year old female here 1 month s/p left total hip arthroplasty. Patient reports overall she is doing well.  She never had to take the oxycodone.  She reports she is back to working from home and trying to find a comfortable chair.  She is sleeping okay.  She takes Tylenol occasionally.  She does not need anything for gait assistance around the house but does use a cane when out and about.  She is inquiring about physical therapy.  No other concerns.    Physical Exam: 58 year old female who is alert and oriented and in no distress.  She has a mildly antalgic gait with single-point cane today.  Her left hip incision is well-healed with no erythema or drainage.  Mild swelling.  No calf tenderness.  She is neurovascular intact distally.  No pain with passive rotation of the hip.    Imaging: AP pelvis and lateral of the xrays of the left hip were ordered and obtained. These show: Left total hip arthroplasty in excellent alignment with no sign of fracture or lucency.      Impression: 58 year old female doing well 1 month s/p left total hip arthroplasty    Plan: Okay to soak the hip at this point.  Use vitamin E oil or cocoa butter on the wound as needed.  Continue to follow him precautions as I reminded her of.  Continue to progress activities.  I think she would benefit from physical therapy to it improve her gait and strength.  I placed an order and they will call to schedule.  As long as she continues to do well, she will follow-up at 1 year postop for an AP and lateral x-ray of the left hip.  Call with any concerns.  Notify us of any dental work over the next year so that we can provide  prophylactic antibiotics.  She understands and agrees with plan.  All questions answered.

## 2024-09-15 ASSESSMENT — ACTIVITIES OF DAILY LIVING (ADL)
RUNNING_ONE_MILE: UNABLE TO DO
HOS_ADL_ITEM_SCORE_TOTAL: 46
WALKING_15_MINUTES_OR_GREATER: NO DIFFICULTY AT ALL
GOING_DOWN_1_FLIGHT_OF_STAIRS: NO DIFFICULTY AT ALL
RECREATIONAL_ACTIVITIES: UNABLE TO DO
ADL_TOTAL_ITEM_SCORE: 0
LIGHT_TO_MODERATE_WORK: SLIGHT DIFFICULTY
STARTING_AND_STOPPING_QUICKLY: UNABLE TO DO
WALKING_FOR_APPROXIMATELY_10_MINUTES: NO DIFFICULTY AT ALL
ADL_SCORE(%): 0
ABILITY_TO_PERFORM_ACTIVITY_WITH_YOUR_NORMAL_TECHNIQUE: SLIGHT DIFFICULTY
WALKING_INITIALLY: MODERATE DIFFICULTY
ADL_HIGHEST_POTENTIAL_SCORE: 68
PUTTING_ON_SOCKS_AND_SHOES: SLIGHT DIFFICULTY
HOS_ADL_SCORE(%): 82.14
SPORTS_COUNT: 9
JUMPING: UNABLE TO DO
SPORTS_TOTAL_ITEM_SCORE: 0
GOING_UP_1_FLIGHT_OF_STAIRS: NO DIFFICULTY AT ALL
SPORTS_HIGHEST_POTENTIAL_SCORE: 36
HEAVY_WORK: SLIGHT DIFFICULTY
ABILITY_TO_PARTICIPATE_IN_YOUR_DESIRED_SPORT_AS_LONG_AS_YOU_WOULD_LIKE: UNABLE TO DO
ROLLING_OVER_IN_BED: SLIGHT DIFFICULTY
SITTING_FOR_15_MINUTES: SLIGHT DIFFICULTY
ADL_COUNT: 17
GETTING_INTO_AND_OUT_OF_A_BATHTUB: SLIGHT DIFFICULTY
HOS_ADL_HIGHEST_POTENTIAL_SCORE: 56
CUTTING/LATERAL_MOVEMENTS: UNABLE TO DO
SWINGING_OBJECTS_LIKE_A_GOLF_CLUB: UNABLE TO DO
LOW_IMPACT_ACTIVITIES_LIKE_FAST_WALKING: SLIGHT DIFFICULTY
PLEASE_INDICATE_YOR_PRIMARY_REASON_FOR_REFERRAL_TO_THERAPY:: HIP
STEPPING_UP_AND_DOWN_CURBS: NO DIFFICULTY AT ALL
TWISTING/PIVOTING_ON_INVOLVED_LEG: NO DIFFICULTY AT ALL
STANDING_FOR_15_MINUTES: NO DIFFICULTY AT ALL
LANDING: UNABLE TO DO
SPORTS_SCORE(%): 0
GETTING_INTO_AND_OUT_OF_AN_AVERAGE_CAR: NO DIFFICULTY AT ALL

## 2024-09-19 ENCOUNTER — THERAPY VISIT (OUTPATIENT)
Dept: PHYSICAL THERAPY | Facility: CLINIC | Age: 59
End: 2024-09-19
Attending: PHYSICIAN ASSISTANT
Payer: COMMERCIAL

## 2024-09-19 DIAGNOSIS — M25.552 ACUTE HIP PAIN, LEFT: Primary | ICD-10-CM

## 2024-09-19 DIAGNOSIS — Z96.642 STATUS POST TOTAL REPLACEMENT OF LEFT HIP: ICD-10-CM

## 2024-09-19 PROCEDURE — 97161 PT EVAL LOW COMPLEX 20 MIN: CPT | Mod: GP | Performed by: PHYSICAL THERAPIST

## 2024-09-19 PROCEDURE — 97110 THERAPEUTIC EXERCISES: CPT | Mod: GP | Performed by: PHYSICAL THERAPIST

## 2024-09-19 NOTE — PROGRESS NOTES
PHYSICAL THERAPY EVALUATION  Type of Visit: Evaluation  Veronica presents today s/p left AVIS performed on 8/13/24. She brings her SPC with her when she is walking and out of the house. Mostly she uses it for the first few steps after standing up from sitting.     She works from home on her main floor and she has changed out her office chair, which has helped her pain. However, she continues to feel stiffness in her left hip and she feels weak with her first couple steps. Veronica notes some pain along her incision, but she notes the wound has been healing nicely. About every 30 minutes she will get up and walk around or stand up during her work day to relieve symptoms. She denies any numbness, tingling or burning and her back has been feeling pretty good.           Fall Risk Screen:  Fall screen completed by: PT  Have you fallen 2 or more times in the past year?: No  Have you fallen and had an injury in the past year?: No  Is patient a fall risk?: No    Subjective       Presenting condition or subjective complaint: Exercises for hip replacement recovery  Date of onset: 08/13/24    Relevant medical history:     Dates & types of surgery:      Prior diagnostic imaging/testing results: MRI; X-ray; Other Hip replacement surgery 8/13/2024   Prior therapy history for the same diagnosis, illness or injury: No      Living Environment  Social support: With a significant other or spouse   Type of home: House; 2-story; Basement   Stairs to enter the home: Yes 2 Is there a railing: Yes     Ramp: No   Stairs inside the home: Yes 15 Is there a railing: Yes     Help at home: None  Equipment owned: Straight Cane; Walker with wheels; Bath bench     Employment: Yes  - working from home  Hobbies/Interests:      Patient goals for therapy: Learn advanced exercises to gain strength in hip, no pain with sitting for work, not use cane, return back to prior level of activity       Objective   HIP EVALUATION  POSTURE: WFL  GAIT:    Weightbearing Status: WBAT  Assistive Device(s): Cane (single end)  Gait Deviations: Antalgic  STRENGTH:  hip flexion: 5/5 bilat, knee extension: 5/5 bilat, knee flexion: L: 5/5, R: 5-/5, glute med: L: 4-/5  FUNCTIONAL TESTS: Double Leg Squat: Anterior knee translation, Knee valgus, Hip internal rotation, and Improper use of glutes/hips  PALPATION:  tension and ttp glute med on left side    Assessment & Plan   CLINICAL IMPRESSIONS  Medical Diagnosis: s/p L AVIS on 8/13/24    Treatment Diagnosis: s/p L AVIS on 8/13/24, acute left hip pain   Impression/Assessment: Patient is a 58 year old female with s/p L AVIS 8/13/24 complaints.  The following significant findings have been identified: Pain, Decreased ROM/flexibility, Decreased strength, Impaired gait, Impaired muscle performance, and Decreased activity tolerance. These impairments interfere with their ability to perform self care tasks, work tasks, recreational activities, household chores, driving , household mobility, and community mobility as compared to previous level of function.     Clinical Decision Making (Complexity):  Clinical Presentation: Stable/Uncomplicated  Clinical Presentation Rationale: based on medical and personal factors listed in PT evaluation  Clinical Decision Making (Complexity): Low complexity    PLAN OF CARE  Treatment Interventions:  Modalities: Biofeedback, Cryotherapy, Dry Needling, E-stim, Hot Pack  Interventions: Gait Training, Manual Therapy, Neuromuscular Re-education, Therapeutic Activity, Therapeutic Exercise, Self-Care/Home Management    Long Term Goals     PT Goal 1  Goal Description: Pt will be able to sit for 2 hours with 0/10p.  Rationale: to maximize safety and independence with performance of ADLs and functional tasks  Target Date: 12/13/24      Frequency of Treatment: every week  Duration of Treatment: 12 weeks    Education Assessment:   Learner/Method: Patient;No Barriers to Learning    Risks and benefits of  evaluation/treatment have been explained.   Patient/Family/caregiver agrees with Plan of Care.     Evaluation Time:     PT Joseph, Low Complexity Minutes (94434): 15     Signing Clinician: Kristyn Galvin PT

## 2024-09-20 PROBLEM — M25.552 ACUTE HIP PAIN, LEFT: Status: ACTIVE | Noted: 2024-09-20

## 2024-09-25 ENCOUNTER — THERAPY VISIT (OUTPATIENT)
Dept: PHYSICAL THERAPY | Facility: CLINIC | Age: 59
End: 2024-09-25
Payer: COMMERCIAL

## 2024-09-25 DIAGNOSIS — M25.552 ACUTE HIP PAIN, LEFT: Primary | ICD-10-CM

## 2024-09-25 PROCEDURE — 97110 THERAPEUTIC EXERCISES: CPT | Mod: GP | Performed by: PHYSICAL THERAPIST

## 2024-09-25 PROCEDURE — 97530 THERAPEUTIC ACTIVITIES: CPT | Mod: GP | Performed by: PHYSICAL THERAPIST

## 2025-04-29 NOTE — ANESTHESIA PROCEDURE NOTES
Airway       Patient location during procedure: OR       Procedure Start/Stop Times: 8/13/2024 8:37 AM  Staff -        Anesthesiologist:  Desean Gutierrez DO       Resident/Fellow: Samantha Seymour MD       Performed By: resident  Consent for Airway        Urgency: elective  Indications and Patient Condition       Indications for airway management: elan-procedural       Induction type:intravenous       Mask difficulty assessment: 1 - vent by mask    Final Airway Details       Final airway type: endotracheal airway       Successful airway: ETT - single  Endotracheal Airway Details        ETT size (mm): 7.0       Cuffed: yes       Successful intubation technique: video laryngoscopy       VL Blade Size: MAC D Blade       Grade View of Cords: 1       Adjucts: stylet       Position: Right       Measured from: lips       Secured at (cm): 24       Bite block used: None    Post intubation assessment        Placement verified by: capnometry, equal breath sounds and chest rise        Number of attempts at approach: 1       Number of other approaches attempted: 0       Secured with: tape       Ease of procedure: easy       Dentition: Intact and Unchanged    Medication(s) Administered   Medication Administration Time: 8/13/2024 8:37 AM        
78.5
1. Trial of Pediasure. 2. Encourage PO intake. 3. Monitor weights, labs, BM's, skin integrity, p.o. intake./General/healthful diet

## 2025-05-11 ENCOUNTER — HEALTH MAINTENANCE LETTER (OUTPATIENT)
Age: 60
End: 2025-05-11

## (undated) DEVICE — GLOVE BIOGEL PI MICRO INDICATOR UNDERGLOVE SZ 7.5 48975

## (undated) DEVICE — LINEN BACK PACK 5440

## (undated) DEVICE — DRSG TEGADERM ALGINATE AG 4X5" 90303

## (undated) DEVICE — GLOVE BIOGEL PI ULTRATOUCH SZ 7.0 41170

## (undated) DEVICE — DRSG TEGADERM 4X10" 1627

## (undated) DEVICE — BLADE SAW SAGITTAL STRK 25X90X1.27MM HD SYS 6 6125-127-090

## (undated) DEVICE — POSITIONER ABDUCTION PILLOW FOAM MED FP-ABDUCTM

## (undated) DEVICE — PACK TOTAL HIP W/POUCH RIVERSIDE LATEX FREE

## (undated) DEVICE — SU VICRYL 0 CT-1 27" J340H

## (undated) DEVICE — LINEN MAYO STAND COVER OVERSIZE PACK 5458

## (undated) DEVICE — GOWN IMPERVIOUS SPECIALTY XLG/XLONG 32474

## (undated) DEVICE — SU DERMABOND ADVANCED .7ML DNX12

## (undated) DEVICE — DECANTER VIAL 2006S

## (undated) DEVICE — TRAY PREP DRY SKIN SCRUB 067

## (undated) DEVICE — SOL WATER IRRIG 1000ML BOTTLE 2F7114

## (undated) DEVICE — SU ETHIBOND 1 CT-1 30" X425H

## (undated) DEVICE — SUCTION MANIFOLD NEPTUNE 2 SYS 4 PORT 0702-020-000

## (undated) DEVICE — STRAP STIRRUP W/SLIP 30187-030

## (undated) DEVICE — STRAP KNEE/BODY 31143004

## (undated) DEVICE — PREP DURAPREP REMOVER 4OZ 8611

## (undated) DEVICE — PREP POVIDONE-IODINE 7.5% SCRUB 4OZ BOTTLE MDS093945

## (undated) DEVICE — PREP DURAPREP 26ML APL 8630

## (undated) DEVICE — SU VICRYL 0 CTX 36" J370H

## (undated) DEVICE — SU PDS II 3-0 PS-1 18" Z683G

## (undated) DEVICE — SU VICRYL+ 2-0 CT 36IN UND VCP957H

## (undated) DEVICE — LINEN TOWEL PACK X5 5464

## (undated) DEVICE — DRAPE STOCKINETTE 8" 8586

## (undated) DEVICE — ESU GROUND PAD UNIVERSAL W/O CORD

## (undated) DEVICE — SU ETHIBOND 1 CTX CR 8/18" CX30D

## (undated) DEVICE — Device

## (undated) DEVICE — SOL NACL 0.9% IRRIG 1000ML BOTTLE 2F7124

## (undated) DEVICE — SU SILK 2-0 SH 30" K833H

## (undated) DEVICE — LINEN GOWN OVERSIZE 5408

## (undated) DEVICE — BLADE KNIFE SURG 10 371110

## (undated) DEVICE — DRAPE IOBAN INCISE 36X23" 6651EZ

## (undated) DEVICE — DRSG KERLIX 4 1/2"X4YDS ROLL 6730

## (undated) RX ORDER — ACETAMINOPHEN 325 MG/1
TABLET ORAL
Status: DISPENSED
Start: 2024-08-13

## (undated) RX ORDER — HYDROMORPHONE HYDROCHLORIDE 1 MG/ML
INJECTION, SOLUTION INTRAMUSCULAR; INTRAVENOUS; SUBCUTANEOUS
Status: DISPENSED
Start: 2024-08-13

## (undated) RX ORDER — TRANEXAMIC ACID 650 MG/1
TABLET ORAL
Status: DISPENSED
Start: 2024-08-13

## (undated) RX ORDER — FENTANYL CITRATE 50 UG/ML
INJECTION, SOLUTION INTRAMUSCULAR; INTRAVENOUS
Status: DISPENSED
Start: 2024-08-13

## (undated) RX ORDER — CEFAZOLIN SODIUM/WATER 2 G/20 ML
SYRINGE (ML) INTRAVENOUS
Status: DISPENSED
Start: 2024-08-13

## (undated) RX ORDER — GABAPENTIN 300 MG/1
CAPSULE ORAL
Status: DISPENSED
Start: 2024-08-13

## (undated) RX ORDER — CELECOXIB 200 MG/1
CAPSULE ORAL
Status: DISPENSED
Start: 2024-08-13

## (undated) RX ORDER — GABAPENTIN 100 MG/1
CAPSULE ORAL
Status: DISPENSED
Start: 2024-08-13